# Patient Record
Sex: MALE | Race: WHITE | NOT HISPANIC OR LATINO | Employment: UNEMPLOYED | URBAN - METROPOLITAN AREA
[De-identification: names, ages, dates, MRNs, and addresses within clinical notes are randomized per-mention and may not be internally consistent; named-entity substitution may affect disease eponyms.]

---

## 2024-09-13 ENCOUNTER — DOCUMENTATION ONLY (OUTPATIENT)
Dept: ANTICOAGULATION | Facility: CLINIC | Age: 49
End: 2024-09-13

## 2024-09-13 ENCOUNTER — LAB REQUISITION (OUTPATIENT)
Dept: LAB | Facility: CLINIC | Age: 49
End: 2024-09-13

## 2024-09-13 ENCOUNTER — DOCUMENTATION ONLY (OUTPATIENT)
Dept: GERIATRICS | Facility: CLINIC | Age: 49
End: 2024-09-13

## 2024-09-13 ENCOUNTER — ANTICOAGULATION THERAPY VISIT (OUTPATIENT)
Dept: ANTICOAGULATION | Facility: CLINIC | Age: 49
End: 2024-09-13

## 2024-09-13 DIAGNOSIS — E11.40 TYPE 2 DIABETES MELLITUS WITH DIABETIC NEUROPATHY, UNSPECIFIED (H): ICD-10-CM

## 2024-09-13 DIAGNOSIS — I82.409 DVT (DEEP VENOUS THROMBOSIS) (H): Primary | ICD-10-CM

## 2024-09-13 DIAGNOSIS — I82.409 DEEP VEIN THROMBOSIS (DVT) (H): Primary | ICD-10-CM

## 2024-09-13 PROBLEM — M10.9 GOUT: Status: ACTIVE | Noted: 2024-09-08

## 2024-09-13 PROBLEM — M48.07 SPINAL STENOSIS OF LUMBOSACRAL REGION: Status: ACTIVE | Noted: 2024-09-06

## 2024-09-13 PROBLEM — E11.8 TYPE 2 DIABETES MELLITUS WITH COMPLICATION, WITHOUT LONG-TERM CURRENT USE OF INSULIN (H): Status: ACTIVE | Noted: 2018-11-17

## 2024-09-13 PROBLEM — E11.9 DM2 (DIABETES MELLITUS, TYPE 2) (H): Status: ACTIVE | Noted: 2024-09-08

## 2024-09-13 PROBLEM — E29.1 DEFICIENCY OF TESTOSTERONE BIOSYNTHESIS: Status: ACTIVE | Noted: 2024-09-13

## 2024-09-13 PROBLEM — F32.9 MAJOR DEPRESSION: Status: ACTIVE | Noted: 2024-09-08

## 2024-09-13 PROBLEM — F41.9 ANXIETY: Status: ACTIVE | Noted: 2019-03-14

## 2024-09-13 PROBLEM — D64.9 ANEMIA: Status: ACTIVE | Noted: 2024-09-08

## 2024-09-13 PROBLEM — G62.9 PERIPHERAL NEUROPATHY: Status: ACTIVE | Noted: 2019-03-14

## 2024-09-13 PROBLEM — R12 HEARTBURN: Status: ACTIVE | Noted: 2024-09-13

## 2024-09-13 PROBLEM — E87.1 HYPONATREMIA: Status: ACTIVE | Noted: 2024-09-08

## 2024-09-13 PROBLEM — A64 SEXUALLY TRANSMITTED INFECTION: Status: ACTIVE | Noted: 2024-09-13

## 2024-09-13 PROBLEM — G99.2 MYELOPATHY CONCURRENT WITH AND DUE TO STENOSIS OF LUMBAR SPINE (H): Status: ACTIVE | Noted: 2024-09-13

## 2024-09-13 PROBLEM — G43.909 MIGRAINES: Status: ACTIVE | Noted: 2019-03-14

## 2024-09-13 PROBLEM — G47.30 SLEEP APNEA: Status: ACTIVE | Noted: 2019-03-14

## 2024-09-13 PROBLEM — I10 HYPERTENSION: Status: ACTIVE | Noted: 2024-09-13

## 2024-09-13 PROBLEM — E78.5 DYSLIPIDEMIA: Status: ACTIVE | Noted: 2018-11-17

## 2024-09-13 PROBLEM — E78.00 HYPERCHOLESTEROLEMIA: Status: ACTIVE | Noted: 2019-03-14

## 2024-09-13 PROBLEM — K25.9 GASTRIC ULCER: Status: ACTIVE | Noted: 2019-03-14

## 2024-09-13 PROBLEM — N52.9 ERECTILE DYSFUNCTION: Status: ACTIVE | Noted: 2024-09-13

## 2024-09-13 PROBLEM — G89.18 POST-OPERATIVE PAIN: Status: ACTIVE | Noted: 2024-09-08

## 2024-09-13 PROBLEM — F41.1 GAD (GENERALIZED ANXIETY DISORDER): Status: ACTIVE | Noted: 2024-09-08

## 2024-09-13 PROBLEM — I10 ESSENTIAL HYPERTENSION: Status: ACTIVE | Noted: 2018-11-17

## 2024-09-13 PROBLEM — M48.061 MYELOPATHY CONCURRENT WITH AND DUE TO STENOSIS OF LUMBAR SPINE (H): Status: ACTIVE | Noted: 2024-09-13

## 2024-09-13 LAB
ANION GAP SERPL CALCULATED.3IONS-SCNC: 9 MMOL/L (ref 7–15)
BUN SERPL-MCNC: 11.8 MG/DL (ref 6–20)
CALCIUM SERPL-MCNC: 8.7 MG/DL (ref 8.8–10.4)
CHLORIDE SERPL-SCNC: 101 MMOL/L (ref 98–107)
CREAT SERPL-MCNC: 0.62 MG/DL (ref 0.67–1.17)
EGFRCR SERPLBLD CKD-EPI 2021: >90 ML/MIN/1.73M2
ERYTHROCYTE [DISTWIDTH] IN BLOOD BY AUTOMATED COUNT: 16.7 % (ref 10–15)
GLUCOSE SERPL-MCNC: 184 MG/DL (ref 70–99)
HCO3 SERPL-SCNC: 26 MMOL/L (ref 22–29)
HCT VFR BLD AUTO: 30.6 % (ref 40–53)
HGB BLD-MCNC: 10.1 G/DL (ref 13.3–17.7)
INR (EXTERNAL): 2
MCH RBC QN AUTO: 29 PG (ref 26.5–33)
MCHC RBC AUTO-ENTMCNC: 33 G/DL (ref 31.5–36.5)
MCV RBC AUTO: 88 FL (ref 78–100)
PLATELET # BLD AUTO: 260 10E3/UL (ref 150–450)
POTASSIUM SERPL-SCNC: 4 MMOL/L (ref 3.4–5.3)
RBC # BLD AUTO: 3.48 10E6/UL (ref 4.4–5.9)
SODIUM SERPL-SCNC: 136 MMOL/L (ref 135–145)
WBC # BLD AUTO: 7.9 10E3/UL (ref 4–11)

## 2024-09-13 PROCEDURE — 85027 COMPLETE CBC AUTOMATED: CPT | Performed by: NURSE PRACTITIONER

## 2024-09-13 PROCEDURE — 80048 BASIC METABOLIC PNL TOTAL CA: CPT | Performed by: NURSE PRACTITIONER

## 2024-09-13 NOTE — PROGRESS NOTES
ANTICOAGULATION CLINIC REFERRAL RENEWAL REQUEST       An annual renewal order is required for all patients referred to [facility].?  Please review and sign the pended referral order for Luiz Matthews.       ANTICOAGULATION SUMMARY      Warfarin indication(s)   DVT    Mechanical heart valve present?  NO       Current goal range   INR: 2.0-3.0     Goal appropriate for indication? Goal INR 2-3, standard for indication(s) above     Time in Therapeutic Range (TTR)  (Goal > 60%) NA%       Office visit with referring provider's group within last year no new admit on 9/12/24       Tracee Lucas, RN  [facility]

## 2024-09-13 NOTE — PROGRESS NOTES
ANTICOAGULATION MANAGEMENT     Luiz Matthews 49 year old male is on warfarin with therapeutic INR result. (Goal INR 2.0-3.0)    Recent labs: (last 7 days)     09/13/24  1526   INR 2.0     Recent Labs     09/12/24  0601 09/11/24  1053 09/10/24  1054 09/06/24  1500 09/05/24  1123   INR 1.7 H 1.3 H 1.2 1.1 1.1     Dosing and Monitoring History   Date INR/CFX Warfarin Dose   9/10 1.2 6 mg   9/11 1.3 8mg   9/12 1.7 6mg     Per RHP note from IP: Home warfarin dose: alternates 10 mg and 6 mg every other day     ASSESSMENT     Source(s): Chart Review     Warfarin doses taken: While hospitalized on 9/6: anticoagulation calendar updated with inpatient dosing.  Discharged on: patient discharge on 6 mg and to recheck today 9/13/24  Diet:  KALEY-new admit  Medication/supplement changes: None noted  New illness, injury, or hospitalization: Yes: hospitalized for: Procedure: Procedure(s):  Anterior Spine interbody Fusion L1 to: S1  Posterior Spine Fusion with Instrumentation T4 to: S1, [place], Redo Decompression - Foraminotomy L2-3, L3-4 and L5-S1 Bilateral  Signs or symptoms of bleeding or clotting: No  Previous result: Subtherapeutic  Additional findings: None       PLAN     Recommended plan for no diet, medication or health factor changes affecting INR     Dosing Instructions:  change dose to 3 mg daily  with next INR in 3 days       Summary  As of 9/13/2024      Full warfarin instructions:  3 mg every day   Next INR check:  9/16/2024               Telephone call with [facility] nurse (medical care for seniors) who verbalizes understanding and agrees to plan and who agrees to plan and repeated back plan correctly    Orders given to  Homecare nurse/facility to recheck    Education provided: Please call back if any changes to your diet, medications or how you've been taking warfarin    Plan made with [facility] Pharmacist Yuliana Lucas, OPAL  9/13/2024  Anticoagulation Clinic  [facility]  routing messages: p [facility] (TCU/LTC/nursing home)  ACC patient phone line: [phone]        _______________________________________________________________________     Anticoagulation Episode Summary       Current INR goal:  2.0-3.0   TTR:  --   Target end date:  Indefinite   Send INR reminders to:  [facility] (TCU/LTC/RICK)    Indications    Deep vein thrombosis (DVT) (H) [I82.409]  DVT (deep venous thrombosis) (H) [I82.409]             Comments:  [facility]   [phone]                 Anticoagulation Care Providers       Provider Role Specialty Phone number    Liliane Pratt NP Referring Nurse Practitioner [phone]

## 2024-09-16 ENCOUNTER — ANTICOAGULATION THERAPY VISIT (OUTPATIENT)
Dept: ANTICOAGULATION | Facility: CLINIC | Age: 49
End: 2024-09-16

## 2024-09-16 ENCOUNTER — TRANSITIONAL CARE UNIT VISIT (OUTPATIENT)
Dept: GERIATRICS | Facility: CLINIC | Age: 49
End: 2024-09-16

## 2024-09-16 VITALS
TEMPERATURE: 95.6 F | BODY MASS INDEX: 36.45 KG/M2 | OXYGEN SATURATION: 99 % | HEART RATE: 65 BPM | HEIGHT: 78 IN | RESPIRATION RATE: 20 BRPM | WEIGHT: 315 LBS | DIASTOLIC BLOOD PRESSURE: 75 MMHG | SYSTOLIC BLOOD PRESSURE: 132 MMHG

## 2024-09-16 DIAGNOSIS — I10 BENIGN ESSENTIAL HYPERTENSION: ICD-10-CM

## 2024-09-16 DIAGNOSIS — Z79.4 TYPE 2 DIABETES MELLITUS WITH HYPEROSMOLARITY WITHOUT COMA, WITH LONG-TERM CURRENT USE OF INSULIN (H): ICD-10-CM

## 2024-09-16 DIAGNOSIS — N40.0 BENIGN PROSTATIC HYPERPLASIA WITHOUT LOWER URINARY TRACT SYMPTOMS: ICD-10-CM

## 2024-09-16 DIAGNOSIS — F41.8 DEPRESSION WITH ANXIETY: ICD-10-CM

## 2024-09-16 DIAGNOSIS — Z98.1 S/P LUMBAR SPINAL FUSION: ICD-10-CM

## 2024-09-16 DIAGNOSIS — K59.01 SLOW TRANSIT CONSTIPATION: ICD-10-CM

## 2024-09-16 DIAGNOSIS — E02 SUBCLINICAL IODINE-DEFICIENCY HYPOTHYROIDISM: ICD-10-CM

## 2024-09-16 DIAGNOSIS — M54.40 CHRONIC LOW BACK PAIN WITH SCIATICA, SCIATICA LATERALITY UNSPECIFIED, UNSPECIFIED BACK PAIN LATERALITY: Primary | ICD-10-CM

## 2024-09-16 DIAGNOSIS — E11.00 TYPE 2 DIABETES MELLITUS WITH HYPEROSMOLARITY WITHOUT COMA, WITH LONG-TERM CURRENT USE OF INSULIN (H): ICD-10-CM

## 2024-09-16 DIAGNOSIS — G60.3 IDIOPATHIC PROGRESSIVE NEUROPATHY: ICD-10-CM

## 2024-09-16 DIAGNOSIS — R91.8 PULMONARY NODULES: ICD-10-CM

## 2024-09-16 DIAGNOSIS — Z86.718 PERSONAL HISTORY OF DVT (DEEP VEIN THROMBOSIS): ICD-10-CM

## 2024-09-16 DIAGNOSIS — R94.31 QT PROLONGATION: ICD-10-CM

## 2024-09-16 DIAGNOSIS — G47.30 SLEEP APNEA, UNSPECIFIED TYPE: ICD-10-CM

## 2024-09-16 DIAGNOSIS — D64.9 ACUTE ON CHRONIC ANEMIA: ICD-10-CM

## 2024-09-16 DIAGNOSIS — G89.29 CHRONIC LOW BACK PAIN WITH SCIATICA, SCIATICA LATERALITY UNSPECIFIED, UNSPECIFIED BACK PAIN LATERALITY: Primary | ICD-10-CM

## 2024-09-16 DIAGNOSIS — G89.4 CHRONIC PAIN SYNDROME: ICD-10-CM

## 2024-09-16 LAB — INR (EXTERNAL): 2.4

## 2024-09-16 PROCEDURE — 99418 PROLNG IP/OBS E/M EA 15 MIN: CPT | Performed by: NURSE PRACTITIONER

## 2024-09-16 PROCEDURE — 99310 SBSQ NF CARE HIGH MDM 45: CPT | Performed by: NURSE PRACTITIONER

## 2024-09-16 RX ORDER — LEVOTHYROXINE SODIUM 75 UG/1
75 TABLET ORAL EVERY MORNING
COMMUNITY
Start: 2024-09-16

## 2024-09-16 RX ORDER — TAMSULOSIN HYDROCHLORIDE 0.4 MG/1
0.4 CAPSULE ORAL DAILY
COMMUNITY
Start: 2024-09-16

## 2024-09-16 RX ORDER — METHOCARBAMOL 750 MG/1
750 TABLET, FILM COATED ORAL 4 TIMES DAILY
COMMUNITY
Start: 2024-09-16

## 2024-09-16 RX ORDER — DIAZEPAM 5 MG
5 TABLET ORAL 2 TIMES DAILY PRN
COMMUNITY
Start: 2024-09-16 | End: 2024-09-24

## 2024-09-16 RX ORDER — BUPRENORPHINE 2 MG/1
2 TABLET SUBLINGUAL DAILY
COMMUNITY
Start: 2024-09-16 | End: 2024-09-17

## 2024-09-16 RX ORDER — ALLOPURINOL 300 MG/1
300 TABLET ORAL DAILY
COMMUNITY
Start: 2024-09-16

## 2024-09-16 RX ORDER — DULOXETIN HYDROCHLORIDE 60 MG/1
60 CAPSULE, DELAYED RELEASE ORAL AT BEDTIME
COMMUNITY
Start: 2024-09-16

## 2024-09-16 RX ORDER — BENAZEPRIL HYDROCHLORIDE 40 MG/1
40 TABLET ORAL EVERY MORNING
COMMUNITY
Start: 2024-09-16

## 2024-09-16 RX ORDER — MORPHINE SULFATE 15 MG/1
15 TABLET ORAL 4 TIMES DAILY PRN
COMMUNITY
Start: 2024-09-16 | End: 2024-09-17

## 2024-09-16 RX ORDER — ACETAMINOPHEN 500 MG
1000 TABLET ORAL 4 TIMES DAILY
COMMUNITY
Start: 2024-09-16

## 2024-09-16 RX ORDER — BUSPIRONE HYDROCHLORIDE 15 MG/1
15 TABLET ORAL 2 TIMES DAILY
COMMUNITY
Start: 2024-09-16

## 2024-09-16 RX ORDER — GABAPENTIN 300 MG/1
600 CAPSULE ORAL 3 TIMES DAILY
COMMUNITY
Start: 2024-09-16 | End: 2024-09-26

## 2024-09-16 RX ORDER — NICOTINE 21 MG/24HR
1 PATCH, TRANSDERMAL 24 HOURS TRANSDERMAL EVERY 24 HOURS
COMMUNITY
Start: 2024-09-16

## 2024-09-16 RX ORDER — ATORVASTATIN CALCIUM 80 MG/1
80 TABLET, FILM COATED ORAL AT BEDTIME
COMMUNITY
Start: 2024-09-16

## 2024-09-16 RX ORDER — AMOXICILLIN 250 MG
2 CAPSULE ORAL 2 TIMES DAILY
COMMUNITY
Start: 2024-09-16

## 2024-09-16 RX ORDER — CELECOXIB 200 MG/1
200 CAPSULE ORAL 2 TIMES DAILY
COMMUNITY
Start: 2024-09-16

## 2024-09-16 RX ORDER — SENNOSIDES A AND B 8.6 MG/1
1 TABLET, FILM COATED ORAL DAILY PRN
COMMUNITY
Start: 2024-09-16

## 2024-09-16 RX ORDER — FENOFIBRATE 145 MG/1
145 TABLET, COATED ORAL DAILY
COMMUNITY
Start: 2024-09-16

## 2024-09-16 RX ORDER — METOPROLOL TARTRATE 100 MG
100 TABLET ORAL 2 TIMES DAILY
COMMUNITY
Start: 2024-09-16

## 2024-09-16 RX ORDER — MORPHINE SULFATE 15 MG/1
30 TABLET, FILM COATED, EXTENDED RELEASE ORAL EVERY 12 HOURS
COMMUNITY
Start: 2024-09-16 | End: 2024-09-20

## 2024-09-16 RX ORDER — POLYETHYLENE GLYCOL 3350 17 G/17G
17 POWDER, FOR SOLUTION ORAL DAILY PRN
COMMUNITY
Start: 2024-09-16

## 2024-09-16 RX ORDER — ALBUTEROL SULFATE 90 UG/1
2 AEROSOL, METERED RESPIRATORY (INHALATION) 4 TIMES DAILY PRN
COMMUNITY
Start: 2024-09-16

## 2024-09-16 RX ORDER — GUAIFENESIN 600 MG/1
1200 TABLET, EXTENDED RELEASE ORAL 2 TIMES DAILY
COMMUNITY
Start: 2024-09-16

## 2024-09-16 RX ORDER — ASPIRIN 325 MG
325 TABLET, DELAYED RELEASE (ENTERIC COATED) ORAL DAILY
COMMUNITY
Start: 2024-09-16

## 2024-09-16 NOTE — LETTER
9/16/2024      Luiz Matthews  [address] [address]        [facility]    Code Status:  FULL CODE   Visit Type:   Chief Complaint   Patient presents with     TCU Admission     [facility] 9/6/2024 - 9/12/2024     Facility:  [facility] [[id number]]         HPI: Luiz Matthews is a 49 year old male who I am seeing today for admit to the TCU. Past medical history includes chronic back pain with radiculopathy, chronic opioid use, history of DVT on warfarin, hypertension, dyslipidemia, GERD with history of gastric ulcer, obesity, obstructive sleep apnea, depression with anxiety, BPH, hypothyroidism, gout, tobacco abuse, EtOH history in remission, thrombocytopenia and type 2 diabetes on insulin.  Patient with a history of spinal stenosis of the lumbar sacral region.  He is underwent surgery with a failed decompression in the past.  Patient underwent anterior spinal fusion of L1-S1 and posterior spinal fusion with instrumentation of T4-S1.  He had a large blood loss volume of 4050 cc requiring 1 unit of fresh frozen plasma.  History of chronic pain syndrome.  He was on oxycodone, methocarbamol, diazepam, amitriptyline and hydroxyzine at home.  He was seen by the pain team several times during his hospitalization and ultimately was placed on long-acting and short acting morphine.  Also on buprenorphine. He continues on methocarbamol and diazepam.  He was followed outpatient by pain clinic.  TLSO splinting out of bed.  History of major depression with anxiety.  Continues on buspirone, amitriptyline and duloxetine.  He is not on his hydroxyzine due to prolonged QTc during hospitalization.  Obstructive sleep apnea on CPAP.  BPH on Flomax.  Hypertension.  Metoprolol increased during hospitalization.  His Lasix and chlorthalidone continue to be on hold due to poor oral intake.  He is also on benazepril.  History of DVT back in his 20s while being a .  He had an  IVC filter placed on 9//24 preventatively before surgery.  Continues on warfarin.    Transitional Care Course: Today patient lying in bed.  He continues to report pain in his back radiating down into his left buttocks, hip and groin.  He is asking for an increase in his pain medication.  With him and his wife via phone I did report review his pain management thoroughly.  He continues on methocarbamol 750 mg 4 times daily.  He is receiving MS Contin 30 mg twice daily.  He has short acting morphine 15 mg every 4 hours as needed.  He is having increased spasms however with look back of his medications he is not utilizing all of his as needed.  He also has as needed Valium which she is only used once.  Patient is able to get out of bed and ambulate with a rolling walker.  He went out for a visit over the weekend with his wife.  He reports some shortness of breath with exertion.  No cough.  Afebrile.  He is emptying his bladder.  He reports he had a very very large bowel movement last evening and a smaller one this a.m.  He continues on senna.  He had a lung nodule that was found incidentally during hospitalization which was thought to be possible infectious process and recommended repeat CT in 3 months.  He continues on warfarin.  INR 2.4.  Blood pressure appears satisfactory.  Diabetes mellitus type 2.  He is on Lantus and Ozempic.  Currently metformin and glimepiride on hold.  Appetite is beginning to improve.  Today he is eating multiple sweets including Ermias cups.  Patient educated on diabetic diet.  Patient with diabetic foot ulcer to the right great toe.  He was being followed by the wound center per his wife outpatient.  Patient is from [place].  He has a small 0.3 x 0.3 ulcer to the right great toe.  No drainage on dressing.  No redness or warmth.      Assessment/Plan:     Chronic low back pain with sciatica, sciatica laterality unspecified, unspecified back pain laterality  S/P lumbar spinal fusion  Chronic pain  syndrome  -Status post anterior spinal interbody fusion L1-S1; status post posterior spine fusion with instrumentation T4-S1.  -Encourage out of bed activity.  -TLSO OOB.   -Continue methocarbamol 750 mg every 6 hours  -Continue MS Contin 30 mg every 12 hours  -Continue morphine immediate release 15 mg every 4 hours as needed.  Every 4 hours pain assessment.  -Diazepam 5 mg twice daily as needed.  -Celebrex 200 mg 3 times daily ending on 9/19/2024.  -Continue buprenorphine 2 mg daily.    Idiopathic progressive neuropathy  -Continue gabapentin 600 mg 3 times daily.    Type 2 diabetes mellitus with hyperosmolarity without coma, with long-term current use of insulin (H)  -Consistent carb diet.  -Accu-Cheks 4 times daily.  -Increase Lantus to 44 units nightly.  -Continue sliding scale with meals 3 times daily.  -Currently off Ozempic, hold the provide and metformin.    Sleep apnea, unspecified type  -Continue CPAP.    Benign essential hypertension  -Currently his home chlorthalidone and Lasix are being held. (Home dose of Lasix was 40 mg in the a.m.).   -Metoprolol 100 mg twice daily.  -Benazepril 40 mg daily  -Monitor BP    Personal history of DVT (deep vein thrombosis)  -IVC filter placed on 9//24.  -Continue warfarin.  -INR today 2.4.  INR clinic managing Coumadin.  -Her IR filter can be retrievable or permanent.  Set up filter removal 1 is no longer needed through IR.    Subclinical iodine-deficiency hypothyroidism  TSH 2.91 on 9/8.  -Levothyroxine 75 mcg daily.    Benign prostatic hyperplasia without lower urinary tract symptoms  -Flomax 0.4 mg nightly.    Depression with anxiety  -Continue prior to admit buspirone 15 mg twice daily.  -Continue prior to admit amitriptyline 25 mg nightly.  -Continue prior to admit duloxetine 60 mg nightly.  -Patient currently not on hydroxyzine but was receiving hydroxyzine 25 mg every 8 hours as needed at home.    Pulmonary nodule  -Found incidentally on chest CTA.  -Recommend  repeat CT scan in 3 months.    Acute on chronic anemia  Thrombocytopenia  -Hemoglobin 10.1.  -Platelets slowly trending down from baseline of 219.  Last platelets 148.  -Mild AST elevation but improving most likely to poor perfusion.    QT prolongation  -On multiple sedating and QT prolonging meds at baseline including amitriptyline, buprenorphine as well as pantoprazole and furosemide.  -Lasix and chlorthalidone continue to be on hold.  -EKG showed possible new right bundle branch block with ST depression and some chest leads on 9/6.  No past TTE in system.  Patient underwent TTE, CT CA and echo which were reassuring.  -Last QTc 510.     Slow transit constipation  -Continue senna twice daily.    History of tobacco abuse  -Smoked 2 packs/day.  -Continue nicotine patch.    -Okay for PT OT eval and treat.    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     DVT (deep venous thrombosis) (H) 09/08/2024     Dyslipidemia 11/17/2018     Essential hypertension 11/17/2018     Hypertension 09/13/2024     CORIE (generalized anxiety disorder) 09/08/2024     Gastric ulcer 03/14/2019     Gout 09/08/2024     Heartburn 09/13/2024     Sexually transmitted infection 09/13/2024     Post-operative pain 09/08/2024     Peripheral neuropathy 03/14/2019     Migraines 03/14/2019     Major depression 09/08/2024     Hyponatremia 09/08/2024     Hypercholesterolemia 03/14/2019     Sleep apnea 03/14/2019     Myelopathy concurrent with and due to stenosis of lumbar spine (H) 09/13/2024     Spinal stenosis of lumbosacral region 09/06/2024     DM2 (diabetes mellitus, type 2) (H) 09/08/2024     Type 2 diabetes mellitus with complication, without long-term current use of insulin (H) 11/17/2018     Erectile dysfunction 09/13/2024     Deep vein thrombosis (DVT) (H) 09/13/2024     Resolved Ambulatory Problems     Diagnosis Date Noted     No Resolved  Ambulatory Problems     No Additional Past Medical History     Allergies   Allergen Reactions     Penicillins Hives and Swelling     Empagliflozin Other (See Comments)     Hypotension       All Meds and Allergies reviewed in the record at the facility and is the most up-to-date.    Post Discharge Medication Reconciliation Status: discharge medications reconciled, continue medications without change  Current Outpatient Medications   Medication Sig Dispense Refill     acetaminophen (TYLENOL) 500 MG tablet Take 1,000 mg by mouth 4 times daily.       albuterol (PROAIR HFA/PROVENTIL HFA/VENTOLIN HFA) 108 (90 Base) MCG/ACT inhaler Inhale 2 puffs into the lungs 4 times daily as needed for shortness of breath, wheezing or cough.       allopurinol (ZYLOPRIM) 300 MG tablet Take 300 mg by mouth daily.       amitriptyline (ELAVIL) 25 MG tablet Take 25 mg by mouth at bedtime.       aspirin (ASA) 325 MG EC tablet Take 325 mg by mouth daily.       atorvastatin (LIPITOR) 80 MG tablet Take 80 mg by mouth at bedtime.       benazepril (LOTENSIN) 40 MG tablet Take 40 mg by mouth every morning.       buprenorphine (SUBUTEX) 2 MG SUBL sublingual tablet Place 2 mg under the tongue daily.       busPIRone (BUSPAR) 15 MG tablet Take 15 mg by mouth 2 times daily.       celecoxib (CELEBREX) 200 MG capsule Take 200 mg by mouth 2 times daily.       diazepam (VALIUM) 5 MG tablet Take 5 mg by mouth 2 times daily as needed for anxiety.       DULoxetine (CYMBALTA) 60 MG capsule Take 60 mg by mouth at bedtime.       fenofibrate (TRICOR) 145 MG tablet Take 145 mg by mouth daily.       gabapentin (NEURONTIN) 300 MG capsule Take 600 mg by mouth 3 times daily.       guaiFENesin (MUCINEX) 600 MG 12 hr tablet Take 1,200 mg by mouth 2 times daily.       insulin aspart (NOVOLOG PEN) 100 UNIT/ML pen Inject subcutaneously 3 times daily (with meals). Per Sliding Scale;  If Blood Sugar is less than 70, call MD.  If Blood Sugar is 150 to 199, give 2 Units.  If  "Blood Sugar is 200 to 249, give 4 Units.  If Blood Sugar is 250 to 299, give 6 Units.  If Blood Sugar is 300 to 349, give 8 Units.  If Blood Sugar is 350 to 399, give 10 Units.  If Blood Sugar is 400 to 499, give 12 Units.       insulin glargine (LANTUS PEN) 100 UNIT/ML pen Inject 40 Units subcutaneously at bedtime.       levothyroxine (SYNTHROID/LEVOTHROID) 75 MCG tablet Take 75 mcg by mouth every morning.       methocarbamol (ROBAXIN) 750 MG tablet Take 750 mg by mouth 4 times daily.       metoprolol tartrate (LOPRESSOR) 100 MG tablet Take 100 mg by mouth 2 times daily.       morphine (MS CONTIN) 15 MG CR tablet Take 30 mg by mouth every 12 hours.       morphine (MSIR) 15 MG IR tablet Take 15 mg by mouth 4 times daily as needed for severe pain.       naloxone (NARCAN) 4 MG/0.1ML nasal spray Spray 4 mg into one nostril alternating nostrils as needed for opioid reversal. every 2-3 minutes until assistance arrives       nicotine (NICODERM CQ) 21 MG/24HR 24 hr patch Place 1 patch onto the skin every 24 hours.       omeprazole (PRILOSEC) 20 MG DR capsule Take 20 mg by mouth daily.       polyethylene glycol (MIRALAX) 17 g packet Take 17 g by mouth daily as needed for constipation.       senna (SENOKOT) 8.6 MG tablet Take 1 tablet by mouth daily as needed for constipation.       senna-docusate (SENOKOT-S/PERICOLACE) 8.6-50 MG tablet Take 2 tablets by mouth 2 times daily.       tamsulosin (FLOMAX) 0.4 MG capsule Take 0.4 mg by mouth daily.       No current facility-administered medications for this visit.       REVIEW OF SYSTEMS:   10 point review of systems reviewed and pertinent positives in the HPI.     PHYSICAL EXAMINATION:  Physical Exam     Vital signs: /75   Pulse 65   Temp (!) 95.6  F (35.3  C)   Resp 20   Ht 1.994 m (6' 6.5\")   Wt 147.4 kg (325 lb)   SpO2 99%   BMI 37.08 kg/m    General: Awake, Alert, oriented x3, lying in bed, appropriately, follows simple commands, conversant  HEENT: Pink " conjunctiva, moist oral mucosa  NECK: Supple  CVS:  S1  S2, without murmur or gallop.   LUNG: Clear to auscultation, No wheezes, rales or rhonci.  BACK: Dressing intact to incision from the lumbar spine to top of thoracic spine.   ABDOMEN: Soft, obese, with positive bowel sounds.  Abdominal incision intact with Steri-Strips.  No drainage.  EXTREMITIES: Good range of motion on both upper and lower extremities, no pedal edema, no calf tenderness  SKIN: Right great toe with small ulcer.  No drainage.  No redness or warmth.  NEUROLOGIC: Intact, pulses palpable  PSYCHIATRIC: Cognition intact      Labs:  All labs reviewed in the nursing home record and Epic   @  Lab Results   Component Value Date    WBC 7.9 09/13/2024     Lab Results   Component Value Date    RBC 3.48 09/13/2024     Lab Results   Component Value Date    HGB 10.1 09/13/2024     Lab Results   Component Value Date    HCT 30.6 09/13/2024     Lab Results   Component Value Date    MCV 88 09/13/2024     Lab Results   Component Value Date    MCH 29.0 09/13/2024     Lab Results   Component Value Date    MCHC 33.0 09/13/2024     Lab Results   Component Value Date    RDW 16.7 09/13/2024     Lab Results   Component Value Date     09/13/2024        @Last Comprehensive Metabolic Panel:  Sodium   Date Value Ref Range Status   09/13/2024 136 135 - 145 mmol/L Final     Potassium   Date Value Ref Range Status   09/13/2024 4.0 3.4 - 5.3 mmol/L Final     Chloride   Date Value Ref Range Status   09/13/2024 101 98 - 107 mmol/L Final     Carbon Dioxide (CO2)   Date Value Ref Range Status   09/13/2024 26 22 - 29 mmol/L Final     Anion Gap   Date Value Ref Range Status   09/13/2024 9 7 - 15 mmol/L Final     Glucose   Date Value Ref Range Status   09/13/2024 184 (H) 70 - 99 mg/dL Final     Urea Nitrogen   Date Value Ref Range Status   09/13/2024 11.8 6.0 - 20.0 mg/dL Final     Creatinine   Date Value Ref Range Status   09/13/2024 0.62 (L) 0.67 - 1.17 mg/dL Final     GFR  Estimate   Date Value Ref Range Status   09/13/2024 >90 >60 mL/min/1.73m2 Final     Comment:     eGFR calculated using 2021 CKD-EPI equation.     Calcium   Date Value Ref Range Status   09/13/2024 8.7 (L) 8.8 - 10.4 mg/dL Final     Comment:     Reference intervals for this test were updated on 7/16/2024 to reflect our healthy population more accurately. There may be differences in the flagging of prior results with similar values performed with this method. Those prior results can be interpreted in the context of the updated reference intervals.     60 minutes spent preparing for this visit as well as face-to-face time spent with patient reviewing medications, pain management, imaging, labs as well as speaking with patient and his wife and collaborating with nursing staff.      This note has been dictated using voice recognition software. Any grammatical or context distortions are unintentional and inherent to the software    Electronically signed by: Liliane Pratt CNP       Sincerely,        Liliane Pratt NP

## 2024-09-16 NOTE — PROGRESS NOTES
[facility] GERIATRIC SERVICES    Code Status:  FULL CODE   Visit Type:   Chief Complaint   Patient presents with    TCU Admission     Abbott Northwester 9/6/2024 - 9/12/2024     Facility:  [facility] ([facility]) [[id number]]         HPI: Luiz Matthews is a 49 year old male who I am seeing today for admit to the TCU. Past medical history includes chronic back pain with radiculopathy, chronic opioid use, history of DVT on warfarin, hypertension, dyslipidemia, GERD with history of gastric ulcer, obesity, obstructive sleep apnea, depression with anxiety, BPH, hypothyroidism, gout, tobacco abuse, EtOH history in remission, thrombocytopenia and type 2 diabetes on insulin.  Patient with a history of spinal stenosis of the lumbar sacral region.  He is underwent surgery with a failed decompression in the past.  Patient underwent anterior spinal fusion of L1-S1 and posterior spinal fusion with instrumentation of T4-S1.  He had a large blood loss volume of 4050 cc requiring 1 unit of fresh frozen plasma.  History of chronic pain syndrome.  He was on oxycodone, methocarbamol, diazepam, amitriptyline and hydroxyzine at home.  He was seen by the pain team several times during his hospitalization and ultimately was placed on long-acting and short acting morphine.  Also on buprenorphine. He continues on methocarbamol and diazepam.  He was followed outpatient by pain clinic.  TLSO splinting out of bed.  History of major depression with anxiety.  Continues on buspirone, amitriptyline and duloxetine.  He is not on his hydroxyzine due to prolonged QTc during hospitalization.  Obstructive sleep apnea on CPAP.  BPH on Flomax.  Hypertension.  Metoprolol increased during hospitalization.  His Lasix and chlorthalidone continue to be on hold due to poor oral intake.  He is also on benazepril.  History of DVT back in his 20s while being a .  He had an IVC filter placed on 9//24 preventatively before surgery.  Continues on  warfarin.    Transitional Care Course: Today patient lying in bed.  He continues to report pain in his back radiating down into his left buttocks, hip and groin.  He is asking for an increase in his pain medication.  With him and his wife via phone I did report review his pain management thoroughly.  He continues on methocarbamol 750 mg 4 times daily.  He is receiving MS Contin 30 mg twice daily.  He has short acting morphine 15 mg every 4 hours as needed.  He is having increased spasms however with look back of his medications he is not utilizing all of his as needed.  He also has as needed Valium which she is only used once.  Patient is able to get out of bed and ambulate with a rolling walker.  He went out for a visit over the weekend with his wife.  He reports some shortness of breath with exertion.  No cough.  Afebrile.  He is emptying his bladder.  He reports he had a very very large bowel movement last evening and a smaller one this a.m.  He continues on senna.  He had a lung nodule that was found incidentally during hospitalization which was thought to be possible infectious process and recommended repeat CT in 3 months.  He continues on warfarin.  INR 2.4.  Blood pressure appears satisfactory.  Diabetes mellitus type 2.  He is on Lantus and Ozempic.  Currently metformin and glimepiride on hold.  Appetite is beginning to improve.  Today he is eating multiple sweets including Ermias cups.  Patient educated on diabetic diet.  Patient with diabetic foot ulcer to the right great toe.  He was being followed by the wound center per his wife outpatient.  Patient is from [place].  He has a small 0.3 x 0.3 ulcer to the right great toe.  No drainage on dressing.  No redness or warmth.      Assessment/Plan:     Chronic low back pain with sciatica, sciatica laterality unspecified, unspecified back pain laterality  S/P lumbar spinal fusion  Chronic pain syndrome  -Status post anterior spinal interbody fusion L1-S1; status  post posterior spine fusion with instrumentation T4-S1.  -Encourage out of bed activity.  -TLSO OOB.   -Continue methocarbamol 750 mg every 6 hours  -Continue MS Contin 30 mg every 12 hours  -Continue morphine immediate release 15 mg every 4 hours as needed.  Every 4 hours pain assessment.  -Diazepam 5 mg twice daily as needed.  -Celebrex 200 mg 3 times daily ending on 9/19/2024.  -Continue buprenorphine 2 mg daily.    Idiopathic progressive neuropathy  -Continue gabapentin 600 mg 3 times daily.    Type 2 diabetes mellitus with hyperosmolarity without coma, with long-term current use of insulin (H)  -Consistent carb diet.  -Accu-Cheks 4 times daily.  -Increase Lantus to 44 units nightly.  -Continue sliding scale with meals 3 times daily.  -Currently off Ozempic, hold the provide and metformin.    Sleep apnea, unspecified type  -Continue CPAP.    Benign essential hypertension  -Currently his home chlorthalidone and Lasix are being held. (Home dose of Lasix was 40 mg in the a.m.).   -Metoprolol 100 mg twice daily.  -Benazepril 40 mg daily  -Monitor BP    Personal history of DVT (deep vein thrombosis)  -IVC filter placed on 9//24.  -Continue warfarin.  -INR today 2.4.  INR clinic managing Coumadin.  -Her IR filter can be retrievable or permanent.  Set up filter removal 1 is no longer needed through IR.    Subclinical iodine-deficiency hypothyroidism  TSH 2.91 on 9/8.  -Levothyroxine 75 mcg daily.    Benign prostatic hyperplasia without lower urinary tract symptoms  -Flomax 0.4 mg nightly.    Depression with anxiety  -Continue prior to admit buspirone 15 mg twice daily.  -Continue prior to admit amitriptyline 25 mg nightly.  -Continue prior to admit duloxetine 60 mg nightly.  -Patient currently not on hydroxyzine but was receiving hydroxyzine 25 mg every 8 hours as needed at home.    Pulmonary nodule  -Found incidentally on chest CTA.  -Recommend repeat CT scan in 3 months.    Acute on chronic  anemia  Thrombocytopenia  -Hemoglobin 10.1.  -Platelets slowly trending down from baseline of 219.  Last platelets 148.  -Mild AST elevation but improving most likely to poor perfusion.    QT prolongation  -On multiple sedating and QT prolonging meds at baseline including amitriptyline, buprenorphine as well as pantoprazole and furosemide.  -Lasix and chlorthalidone continue to be on hold.  -EKG showed possible new right bundle branch block with ST depression and some chest leads on 9/6.  No past TTE in system.  Patient underwent TTE, CT CA and echo which were reassuring.  -Last QTc 510.     Slow transit constipation  -Continue senna twice daily.    History of tobacco abuse  -Smoked 2 packs/day.  -Continue nicotine patch.    -Okay for PT OT eval and treat.    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    DVT (deep venous thrombosis) (H) 09/08/2024    Dyslipidemia 11/17/2018    Essential hypertension 11/17/2018    Hypertension 09/13/2024    CORIE (generalized anxiety disorder) 09/08/2024    Gastric ulcer 03/14/2019    Gout 09/08/2024    Heartburn 09/13/2024    Sexually transmitted infection 09/13/2024    Post-operative pain 09/08/2024    Peripheral neuropathy 03/14/2019    Migraines 03/14/2019    Major depression 09/08/2024    Hyponatremia 09/08/2024    Hypercholesterolemia 03/14/2019    Sleep apnea 03/14/2019    Myelopathy concurrent with and due to stenosis of lumbar spine (H) 09/13/2024    Spinal stenosis of lumbosacral region 09/06/2024    DM2 (diabetes mellitus, type 2) (H) 09/08/2024    Type 2 diabetes mellitus with complication, without long-term current use of insulin (H) 11/17/2018    Erectile dysfunction 09/13/2024    Deep vein thrombosis (DVT) (H) 09/13/2024     Resolved Ambulatory Problems     Diagnosis Date Noted    No Resolved Ambulatory Problems     No Additional Past Medical History     Allergies    Allergen Reactions    Penicillins Hives and Swelling    Empagliflozin Other (See Comments)     Hypotension       All Meds and Allergies reviewed in the record at the facility and is the most up-to-date.    Post Discharge Medication Reconciliation Status: discharge medications reconciled, continue medications without change  Current Outpatient Medications   Medication Sig Dispense Refill    acetaminophen (TYLENOL) 500 MG tablet Take 1,000 mg by mouth 4 times daily.      albuterol (PROAIR HFA/PROVENTIL HFA/VENTOLIN HFA) 108 (90 Base) MCG/ACT inhaler Inhale 2 puffs into the lungs 4 times daily as needed for shortness of breath, wheezing or cough.      allopurinol (ZYLOPRIM) 300 MG tablet Take 300 mg by mouth daily.      amitriptyline (ELAVIL) 25 MG tablet Take 25 mg by mouth at bedtime.      aspirin (ASA) 325 MG EC tablet Take 325 mg by mouth daily.      atorvastatin (LIPITOR) 80 MG tablet Take 80 mg by mouth at bedtime.      benazepril (LOTENSIN) 40 MG tablet Take 40 mg by mouth every morning.      buprenorphine (SUBUTEX) 2 MG SUBL sublingual tablet Place 2 mg under the tongue daily.      busPIRone (BUSPAR) 15 MG tablet Take 15 mg by mouth 2 times daily.      celecoxib (CELEBREX) 200 MG capsule Take 200 mg by mouth 2 times daily.      diazepam (VALIUM) 5 MG tablet Take 5 mg by mouth 2 times daily as needed for anxiety.      DULoxetine (CYMBALTA) 60 MG capsule Take 60 mg by mouth at bedtime.      fenofibrate (TRICOR) 145 MG tablet Take 145 mg by mouth daily.      gabapentin (NEURONTIN) 300 MG capsule Take 600 mg by mouth 3 times daily.      guaiFENesin (MUCINEX) 600 MG 12 hr tablet Take 1,200 mg by mouth 2 times daily.      insulin aspart (NOVOLOG PEN) 100 UNIT/ML pen Inject subcutaneously 3 times daily (with meals). Per Sliding Scale;  If Blood Sugar is less than 70, call MD.  If Blood Sugar is 150 to 199, give 2 Units.  If Blood Sugar is 200 to 249, give 4 Units.  If Blood Sugar is 250 to 299, give 6 Units.  If  "Blood Sugar is 300 to 349, give 8 Units.  If Blood Sugar is 350 to 399, give 10 Units.  If Blood Sugar is 400 to 499, give 12 Units.      insulin glargine (LANTUS PEN) 100 UNIT/ML pen Inject 40 Units subcutaneously at bedtime.      levothyroxine (SYNTHROID/LEVOTHROID) 75 MCG tablet Take 75 mcg by mouth every morning.      methocarbamol (ROBAXIN) 750 MG tablet Take 750 mg by mouth 4 times daily.      metoprolol tartrate (LOPRESSOR) 100 MG tablet Take 100 mg by mouth 2 times daily.      morphine (MS CONTIN) 15 MG CR tablet Take 30 mg by mouth every 12 hours.      morphine (MSIR) 15 MG IR tablet Take 15 mg by mouth 4 times daily as needed for severe pain.      naloxone (NARCAN) 4 MG/0.1ML nasal spray Spray 4 mg into one nostril alternating nostrils as needed for opioid reversal. every 2-3 minutes until assistance arrives      nicotine (NICODERM CQ) 21 MG/24HR 24 hr patch Place 1 patch onto the skin every 24 hours.      omeprazole (PRILOSEC) 20 MG DR capsule Take 20 mg by mouth daily.      polyethylene glycol (MIRALAX) 17 g packet Take 17 g by mouth daily as needed for constipation.      senna (SENOKOT) 8.6 MG tablet Take 1 tablet by mouth daily as needed for constipation.      senna-docusate (SENOKOT-S/PERICOLACE) 8.6-50 MG tablet Take 2 tablets by mouth 2 times daily.      tamsulosin (FLOMAX) 0.4 MG capsule Take 0.4 mg by mouth daily.       No current facility-administered medications for this visit.       REVIEW OF SYSTEMS:   10 point review of systems reviewed and pertinent positives in the HPI.     PHYSICAL EXAMINATION:  Physical Exam     Vital signs: /75   Pulse 65   Temp (!) 95.6  F (35.3  C)   Resp 20   Ht 1.994 m (6' 6.5\")   Wt 147.4 kg (325 lb)   SpO2 99%   BMI 37.08 kg/m    General: Awake, Alert, oriented x3, lying in bed, appropriately, follows simple commands, conversant  HEENT: Pink conjunctiva, moist oral mucosa  NECK: Supple  CVS:  S1  S2, without murmur or gallop.   LUNG: Clear to " auscultation, No wheezes, rales or rhonci.  BACK: Dressing intact to incision from the lumbar spine to top of thoracic spine.   ABDOMEN: Soft, obese, with positive bowel sounds.  Abdominal incision intact with Steri-Strips.  No drainage.  EXTREMITIES: Good range of motion on both upper and lower extremities, no pedal edema, no calf tenderness  SKIN: Right great toe with small ulcer.  No drainage.  No redness or warmth.  NEUROLOGIC: Intact, pulses palpable  PSYCHIATRIC: Cognition intact      Labs:  All labs reviewed in the nursing home record and Epic   @  Lab Results   Component Value Date    WBC 7.9 09/13/2024     Lab Results   Component Value Date    RBC 3.48 09/13/2024     Lab Results   Component Value Date    HGB 10.1 09/13/2024     Lab Results   Component Value Date    HCT 30.6 09/13/2024     Lab Results   Component Value Date    MCV 88 09/13/2024     Lab Results   Component Value Date    MCH 29.0 09/13/2024     Lab Results   Component Value Date    MCHC 33.0 09/13/2024     Lab Results   Component Value Date    RDW 16.7 09/13/2024     Lab Results   Component Value Date     09/13/2024        @Last Comprehensive Metabolic Panel:  Sodium   Date Value Ref Range Status   09/13/2024 136 135 - 145 mmol/L Final     Potassium   Date Value Ref Range Status   09/13/2024 4.0 3.4 - 5.3 mmol/L Final     Chloride   Date Value Ref Range Status   09/13/2024 101 98 - 107 mmol/L Final     Carbon Dioxide (CO2)   Date Value Ref Range Status   09/13/2024 26 22 - 29 mmol/L Final     Anion Gap   Date Value Ref Range Status   09/13/2024 9 7 - 15 mmol/L Final     Glucose   Date Value Ref Range Status   09/13/2024 184 (H) 70 - 99 mg/dL Final     Urea Nitrogen   Date Value Ref Range Status   09/13/2024 11.8 6.0 - 20.0 mg/dL Final     Creatinine   Date Value Ref Range Status   09/13/2024 0.62 (L) 0.67 - 1.17 mg/dL Final     GFR Estimate   Date Value Ref Range Status   09/13/2024 >90 >60 mL/min/1.73m2 Final     Comment:     eGFR  calculated using 2021 CKD-EPI equation.     Calcium   Date Value Ref Range Status   09/13/2024 8.7 (L) 8.8 - 10.4 mg/dL Final     Comment:     Reference intervals for this test were updated on 7/16/2024 to reflect our healthy population more accurately. There may be differences in the flagging of prior results with similar values performed with this method. Those prior results can be interpreted in the context of the updated reference intervals.     60 minutes spent preparing for this visit as well as face-to-face time spent with patient reviewing medications, pain management, imaging, labs as well as speaking with patient and his wife and collaborating with nursing staff.      This note has been dictated using voice recognition software. Any grammatical or context distortions are unintentional and inherent to the software    Electronically signed by: Liliane Pratt CNP

## 2024-09-16 NOTE — PROGRESS NOTES
ANTICOAGULATION MANAGEMENT     Luiz Matthews 49 year old male is on warfarin with therapeutic INR result. (Goal INR 2.0-3.0)    Recent labs: (last 7 days)     09/16/24  1237   INR 2.4       ASSESSMENT     Source(s): Chart Review and Home Care/Facility Nurse     Warfarin doses taken: Warfarin taken as instructed  Diet: No new diet changes identified  Medication/supplement changes: None noted  New illness, injury, or hospitalization: No  Signs or symptoms of bleeding or clotting: No  Previous result: Therapeutic last visit; previously outside of goal range  Additional findings:  recent surgery.  Has pain. No other changes.        PLAN     Recommended plan for temporary change(s) and ongoing change(s) affecting INR     Dosing Instructions: Continue your current warfarin dose with next INR in 3 days       Summary  As of 9/16/2024      Full warfarin instructions:  3 mg every day   Next INR check:  9/19/2024               Telephone call with [facility] facility nurse who agrees to plan and repeated back plan correctly    Orders given to  Homecare nurse/facility to recheck    Education provided: Please call back if any changes to your diet, medications or how you've been taking warfarin    Plan made per [facility] anticoagulation protocol    Tara Guillermo RN  9/16/2024  Anticoagulation Clinic  [employer] for routing messages: p [facility] (TCU/LTC/[facility])  [facility] patient phone line: [phone]        _______________________________________________________________________     Anticoagulation Episode Summary       Current INR goal:  2.0-3.0   TTR:  --   Target end date:  Indefinite   Send INR reminders to:  [facility] (TCU/LTC/[facility])    Indications    Deep vein thrombosis (DVT) (H) [I82.409]  DVT (deep venous thrombosis) (H) [I82.409]             Comments:  [facility]   [phone]                 Anticoagulation Care Providers       Provider Role Specialty Phone number    Liliane Pratt  NP Referring Nurse Practitioner [phone]

## 2024-09-17 DIAGNOSIS — M48.07 SPINAL STENOSIS OF LUMBOSACRAL REGION: ICD-10-CM

## 2024-09-17 DIAGNOSIS — G89.29 CHRONIC PAIN: Primary | ICD-10-CM

## 2024-09-17 RX ORDER — MORPHINE SULFATE 15 MG/1
15 TABLET ORAL 4 TIMES DAILY PRN
Qty: 30 TABLET | Refills: 0 | Status: SHIPPED | OUTPATIENT
Start: 2024-09-17 | End: 2024-09-24

## 2024-09-17 RX ORDER — BUPRENORPHINE 2 MG/1
2 TABLET SUBLINGUAL DAILY
Qty: 14 TABLET | Refills: 0 | Status: SHIPPED | OUTPATIENT
Start: 2024-09-17

## 2024-09-19 ENCOUNTER — TRANSITIONAL CARE UNIT VISIT (OUTPATIENT)
Dept: GERIATRICS | Facility: CLINIC | Age: 49
End: 2024-09-19

## 2024-09-19 ENCOUNTER — MEDICAL CORRESPONDENCE (OUTPATIENT)
Dept: HEALTH INFORMATION MANAGEMENT | Facility: CLINIC | Age: 49
End: 2024-09-19

## 2024-09-19 ENCOUNTER — ANTICOAGULATION THERAPY VISIT (OUTPATIENT)
Dept: ANTICOAGULATION | Facility: CLINIC | Age: 49
End: 2024-09-19

## 2024-09-19 VITALS
HEIGHT: 78 IN | RESPIRATION RATE: 16 BRPM | OXYGEN SATURATION: 97 % | HEART RATE: 71 BPM | BODY MASS INDEX: 36.45 KG/M2 | TEMPERATURE: 98.1 F | WEIGHT: 315 LBS | SYSTOLIC BLOOD PRESSURE: 156 MMHG | DIASTOLIC BLOOD PRESSURE: 91 MMHG

## 2024-09-19 DIAGNOSIS — Z79.4 TYPE 2 DIABETES MELLITUS WITH HYPEROSMOLARITY WITHOUT COMA, WITH LONG-TERM CURRENT USE OF INSULIN (H): ICD-10-CM

## 2024-09-19 DIAGNOSIS — E11.00 TYPE 2 DIABETES MELLITUS WITH HYPEROSMOLARITY WITHOUT COMA, WITH LONG-TERM CURRENT USE OF INSULIN (H): ICD-10-CM

## 2024-09-19 DIAGNOSIS — I82.409 DEEP VEIN THROMBOSIS (DVT) (H): Primary | ICD-10-CM

## 2024-09-19 DIAGNOSIS — Z86.718 PERSONAL HISTORY OF DVT (DEEP VEIN THROMBOSIS): ICD-10-CM

## 2024-09-19 DIAGNOSIS — G60.3 IDIOPATHIC PROGRESSIVE NEUROPATHY: ICD-10-CM

## 2024-09-19 DIAGNOSIS — G89.4 CHRONIC PAIN SYNDROME: Primary | ICD-10-CM

## 2024-09-19 DIAGNOSIS — G89.29 CHRONIC LOW BACK PAIN WITH SCIATICA, SCIATICA LATERALITY UNSPECIFIED, UNSPECIFIED BACK PAIN LATERALITY: ICD-10-CM

## 2024-09-19 DIAGNOSIS — N40.0 BENIGN PROSTATIC HYPERPLASIA WITHOUT LOWER URINARY TRACT SYMPTOMS: ICD-10-CM

## 2024-09-19 DIAGNOSIS — Z98.1 S/P LUMBAR SPINAL FUSION: ICD-10-CM

## 2024-09-19 DIAGNOSIS — G47.30 SLEEP APNEA, UNSPECIFIED TYPE: ICD-10-CM

## 2024-09-19 DIAGNOSIS — M48.07 SPINAL STENOSIS OF LUMBOSACRAL REGION: ICD-10-CM

## 2024-09-19 DIAGNOSIS — I10 BENIGN ESSENTIAL HYPERTENSION: ICD-10-CM

## 2024-09-19 DIAGNOSIS — I82.409 DVT (DEEP VENOUS THROMBOSIS) (H): ICD-10-CM

## 2024-09-19 DIAGNOSIS — M54.40 CHRONIC LOW BACK PAIN WITH SCIATICA, SCIATICA LATERALITY UNSPECIFIED, UNSPECIFIED BACK PAIN LATERALITY: ICD-10-CM

## 2024-09-19 LAB — INR (EXTERNAL): 2.7

## 2024-09-19 PROCEDURE — 99309 SBSQ NF CARE MODERATE MDM 30: CPT | Performed by: NURSE PRACTITIONER

## 2024-09-19 NOTE — PROGRESS NOTES
Incoming fax from [facility]    Date of result 9/19/24    INR result 2.7    Route result to: [facility] (TCU/LTC/[facility])

## 2024-09-19 NOTE — LETTER
9/19/2024      Luiz Matthews  [address] [facility] [address]        [facility]    Code Status:  FULL CODE   Visit Type:   Chief Complaint   Patient presents with     TCU Follow Up     Facility:  [facility] ([facility]) [[id number]]         HPI: Luiz Matthews is a 49 year old male who I am seeing today for follow up on  the TCU. Past medical history includes chronic back pain with radiculopathy, chronic opioid use, history of DVT on warfarin, hypertension, dyslipidemia, GERD with history of gastric ulcer, obesity, obstructive sleep apnea, depression with anxiety, BPH, hypothyroidism, gout, tobacco abuse, EtOH history in remission, thrombocytopenia and type 2 diabetes on insulin.  Patient with a history of spinal stenosis of the lumbar sacral region.  He is underwent surgery with a failed decompression in the past.  Patient underwent anterior spinal fusion of L1-S1 and posterior spinal fusion with instrumentation of T4-S1.  He had a large blood loss volume of 4050 cc requiring 1 unit of fresh frozen plasma.  History of chronic pain syndrome.  He was on oxycodone, methocarbamol, diazepam, amitriptyline and hydroxyzine at home.  He was seen by the pain team several times during his hospitalization and ultimately was placed on long-acting and short acting morphine.  Also on buprenorphine. He continues on methocarbamol and diazepam.  He was followed outpatient by pain clinic.  TLSO splinting out of bed.  History of major depression with anxiety.  Continues on buspirone, amitriptyline and duloxetine.  He is not on his hydroxyzine due to prolonged QTc during hospitalization.  Obstructive sleep apnea on CPAP.  BPH on Flomax.  Hypertension.  Metoprolol increased during hospitalization.  His Lasix and chlorthalidone continue to be on hold due to poor oral intake.  He is also on benazepril.  History of DVT back in his 20s while being a .  He had an IVC filter placed on 9//24  preventatively before surgery.  Continues on warfarin.    Transitional Care Course: Today patient lying in bed. Incisions to posterior and anterior spine intact. He reports pain from left SI joint radiating down to his right hip and groin. He continues on Methocarbamol, MS contin, Morphine IR, buprenorphine, amitriptyline, gabapentin and Valium. He is ambulating with rolling walker. OOB encouraged. He is eating well. DM2 on Lantus and Ozempic. On chronic AC with Warfarin. INR today 2.7. He reports he is having regular bowel movements and emptying his bladder. He denies any SOB or CP.     Assessment/Plan:     Chronic low back pain with sciatica, sciatica laterality unspecified, unspecified back pain laterality  S/P lumbar spinal fusion  Chronic pain syndrome  -Status post anterior spinal interbody fusion L1-S1; status post posterior spine fusion with instrumentation T4-S1.  -Encourage out of bed activity.  -TLSO OOB.   -Continue methocarbamol 750 mg every 6 hours  -Continue MS Contin 30 mg every 12 hours. (Attempt to wean)   -Continue morphine immediate release 15 mg every 4 hours as needed.  Every 4 hours pain assessment.  -Diazepam 5 mg twice daily as needed.  -Celebrex 200 mg 3 times daily ending on 9/19/2024.  -Continue buprenorphine 2 mg daily.  -Muscle rub TID.   -PT to eval for pain modalities.       Idiopathic progressive neuropathy  -Continue gabapentin 600 mg 3 times daily.    Type 2 diabetes mellitus with hyperosmolarity without coma, with long-term current use of insulin (H)  -Consistent carb diet.  -Accu-Cheks 4 times daily.  -Increase Lantus to 44 units nightly.  -Continue sliding scale with meals 3 times daily.  -Currently off Ozempic, hold the glimepiride and metformin.   -Monitor BS and make adjustments as necessary.     Sleep apnea, unspecified type  -Continue CPAP.    Benign essential hypertension  -Currently his home chlorthalidone and Lasix are being held. (Home dose of Lasix was 40 mg in the  a.m.).   -Metoprolol 100 mg twice daily.  -Benazepril 40 mg daily  -Monitor BP    Personal history of DVT (deep vein thrombosis)  -IVC filter placed on 9//24.  -Continue warfarin.  -INR today 2.7.  INR clinic managing Coumadin.  -Her IR filter can be retrievable or permanent.  Set up filter removal 1 is no longer needed through IR.    Benign prostatic hyperplasia without lower urinary tract symptoms  -Flomax 0.4 mg nightly.    Depression with anxiety  -Continue prior to admit buspirone 15 mg twice daily.  -Continue prior to admit amitriptyline 25 mg nightly.  -Continue prior to admit duloxetine 60 mg nightly.  -Patient currently not on hydroxyzine but was receiving hydroxyzine 25 mg every 8 hours as needed at home.    Acute on chronic anemia  Thrombocytopenia  -Hemoglobin 10.1.  -Platelets slowly trending down from baseline of 219.  Last platelets 148.  -Mild AST elevation but improving most likely to poor perfusion.    QT prolongation  -On multiple sedating and QT prolonging meds at baseline including amitriptyline, buprenorphine as well as pantoprazole and furosemide.  -Lasix and chlorthalidone continue to be on hold.  -EKG showed possible new right bundle branch block with ST depression and some chest leads on 9/6.  No past TTE in system.  Patient underwent TTE, CT CA and echo which were reassuring.  -Last QTc 510.     Slow transit constipation  -Continue senna twice daily.    History of tobacco abuse  -Smoked 2 packs/day.  -Continue nicotine patch.      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     DVT (deep venous thrombosis) (H) 09/08/2024     Dyslipidemia 11/17/2018     Essential hypertension 11/17/2018     Hypertension 09/13/2024     CORIE (generalized anxiety disorder) 09/08/2024     Gastric ulcer 03/14/2019     Gout 09/08/2024     Heartburn 09/13/2024     Sexually transmitted infection 09/13/2024      Post-operative pain 09/08/2024     Peripheral neuropathy 03/14/2019     Migraines 03/14/2019     Major depression 09/08/2024     Hyponatremia 09/08/2024     Hypercholesterolemia 03/14/2019     Sleep apnea 03/14/2019     Myelopathy concurrent with and due to stenosis of lumbar spine (H) 09/13/2024     Spinal stenosis of lumbosacral region 09/06/2024     DM2 (diabetes mellitus, type 2) (H) 09/08/2024     Type 2 diabetes mellitus with complication, without long-term current use of insulin (H) 11/17/2018     Erectile dysfunction 09/13/2024     Deep vein thrombosis (DVT) (H) 09/13/2024     Resolved Ambulatory Problems     Diagnosis Date Noted     No Resolved Ambulatory Problems     No Additional Past Medical History     Allergies   Allergen Reactions     Penicillins Hives and Swelling     Empagliflozin Other (See Comments)     Hypotension       All Meds and Allergies reviewed in the record at the facility and is the most up-to-date.    Current Outpatient Medications   Medication Sig Dispense Refill     camphor-menthol-methyl sal 4-10-30 % CREA Apply topically 3 times daily.       acetaminophen (TYLENOL) 500 MG tablet Take 1,000 mg by mouth 4 times daily.       albuterol (PROAIR HFA/PROVENTIL HFA/VENTOLIN HFA) 108 (90 Base) MCG/ACT inhaler Inhale 2 puffs into the lungs 4 times daily as needed for shortness of breath, wheezing or cough.       allopurinol (ZYLOPRIM) 300 MG tablet Take 300 mg by mouth daily.       amitriptyline (ELAVIL) 25 MG tablet Take 25 mg by mouth at bedtime.       aspirin (ASA) 325 MG EC tablet Take 325 mg by mouth daily.       atorvastatin (LIPITOR) 80 MG tablet Take 80 mg by mouth at bedtime.       benazepril (LOTENSIN) 40 MG tablet Take 40 mg by mouth every morning.       buprenorphine (SUBUTEX) 2 MG SUBL sublingual tablet Place 1 tablet (2 mg) under the tongue daily. 14 tablet 0     busPIRone (BUSPAR) 15 MG tablet Take 15 mg by mouth 2 times daily.       celecoxib (CELEBREX) 200 MG capsule Take 200  mg by mouth 2 times daily.       diazepam (VALIUM) 5 MG tablet Take 5 mg by mouth 2 times daily as needed for anxiety.       DULoxetine (CYMBALTA) 60 MG capsule Take 60 mg by mouth at bedtime.       fenofibrate (TRICOR) 145 MG tablet Take 145 mg by mouth daily.       gabapentin (NEURONTIN) 300 MG capsule Take 600 mg by mouth 3 times daily.       guaiFENesin (MUCINEX) 600 MG 12 hr tablet Take 1,200 mg by mouth 2 times daily.       insulin aspart (NOVOLOG PEN) 100 UNIT/ML pen Inject subcutaneously 3 times daily (with meals). Per Sliding Scale;  If Blood Sugar is less than 70, call MD.  If Blood Sugar is 150 to 199, give 2 Units.  If Blood Sugar is 200 to 249, give 4 Units.  If Blood Sugar is 250 to 299, give 6 Units.  If Blood Sugar is 300 to 349, give 8 Units.  If Blood Sugar is 350 to 399, give 10 Units.  If Blood Sugar is 400 to 499, give 12 Units.       insulin glargine (LANTUS PEN) 100 UNIT/ML pen Inject 40 Units subcutaneously at bedtime.       levothyroxine (SYNTHROID/LEVOTHROID) 75 MCG tablet Take 75 mcg by mouth every morning.       methocarbamol (ROBAXIN) 750 MG tablet Take 750 mg by mouth 4 times daily.       metoprolol tartrate (LOPRESSOR) 100 MG tablet Take 100 mg by mouth 2 times daily.       morphine (MS CONTIN) 15 MG CR tablet Take 30 mg by mouth every 12 hours.       morphine (MSIR) 15 MG IR tablet Take 1 tablet (15 mg) by mouth 4 times daily as needed for severe pain. 30 tablet 0     naloxone (NARCAN) 4 MG/0.1ML nasal spray Spray 4 mg into one nostril alternating nostrils as needed for opioid reversal. every 2-3 minutes until assistance arrives       nicotine (NICODERM CQ) 21 MG/24HR 24 hr patch Place 1 patch onto the skin every 24 hours.       omeprazole (PRILOSEC) 20 MG DR capsule Take 20 mg by mouth daily.       polyethylene glycol (MIRALAX) 17 g packet Take 17 g by mouth daily as needed for constipation.       senna (SENOKOT) 8.6 MG tablet Take 1 tablet by mouth daily as needed for constipation.   "     senna-docusate (SENOKOT-S/PERICOLACE) 8.6-50 MG tablet Take 2 tablets by mouth 2 times daily.       tamsulosin (FLOMAX) 0.4 MG capsule Take 0.4 mg by mouth daily.       No current facility-administered medications for this visit.       REVIEW OF SYSTEMS:   10 point review of systems reviewed and pertinent positives in the HPI.     PHYSICAL EXAMINATION:  Physical Exam     Vital signs: BP (!) 156/91   Pulse 71   Temp 98.1  F (36.7  C)   Resp 16   Ht 1.994 m (6' 6.5\")   Wt 147.4 kg (325 lb)   SpO2 97%   BMI 37.08 kg/m    General: Awake, Alert, oriented x3, lying in bed, appropriately, follows simple commands, conversant  HEENT: Pink conjunctiva, moist oral mucosa  NECK: Supple  CVS:  S1  S2, without murmur or gallop.   LUNG: Clear to auscultation, No wheezes, rales or rhonci.  BACK: Dressing intact to incision from the lumbar spine to top of thoracic spine. Tenderness at the right SI joint with palpitation.   ABDOMEN: Soft, obese, with positive bowel sounds.  Abdominal incision intact with Steri-Strips.  No drainage.  EXTREMITIES: Good range of motion on both upper and lower extremities, no pedal edema, no calf tenderness  SKIN: Right great toe with small ulcer covered.   NEUROLOGIC: Intact, pulses palpable  PSYCHIATRIC: Cognition intact      Labs:  All labs reviewed in the nursing home record and Epic   @  Lab Results   Component Value Date    WBC 7.9 09/13/2024     Lab Results   Component Value Date    RBC 3.48 09/13/2024     Lab Results   Component Value Date    HGB 10.1 09/13/2024     Lab Results   Component Value Date    HCT 30.6 09/13/2024     Lab Results   Component Value Date    MCV 88 09/13/2024     Lab Results   Component Value Date    MCH 29.0 09/13/2024     Lab Results   Component Value Date    MCHC 33.0 09/13/2024     Lab Results   Component Value Date    RDW 16.7 09/13/2024     Lab Results   Component Value Date     09/13/2024        @Last Comprehensive Metabolic Panel:  Sodium   Date " Value Ref Range Status   09/13/2024 136 135 - 145 mmol/L Final     Potassium   Date Value Ref Range Status   09/13/2024 4.0 3.4 - 5.3 mmol/L Final     Chloride   Date Value Ref Range Status   09/13/2024 101 98 - 107 mmol/L Final     Carbon Dioxide (CO2)   Date Value Ref Range Status   09/13/2024 26 22 - 29 mmol/L Final     Anion Gap   Date Value Ref Range Status   09/13/2024 9 7 - 15 mmol/L Final     Glucose   Date Value Ref Range Status   09/13/2024 184 (H) 70 - 99 mg/dL Final     Urea Nitrogen   Date Value Ref Range Status   09/13/2024 11.8 6.0 - 20.0 mg/dL Final     Creatinine   Date Value Ref Range Status   09/13/2024 0.62 (L) 0.67 - 1.17 mg/dL Final     GFR Estimate   Date Value Ref Range Status   09/13/2024 >90 >60 mL/min/1.73m2 Final     Comment:     eGFR calculated using 2021 CKD-EPI equation.     Calcium   Date Value Ref Range Status   09/13/2024 8.7 (L) 8.8 - 10.4 mg/dL Final     Comment:     Reference intervals for this test were updated on 7/16/2024 to reflect our healthy population more accurately. There may be differences in the flagging of prior results with similar values performed with this method. Those prior results can be interpreted in the context of the updated reference intervals.       This note has been dictated using voice recognition software. Any grammatical or context distortions are unintentional and inherent to the software    Electronically signed by: Liliane Pratt CNP       Sincerely,        Liliane Pratt, NP

## 2024-09-19 NOTE — PROGRESS NOTES
ANTICOAGULATION MANAGEMENT     Luiz Matthews 49 year old male is on warfarin with therapeutic INR result. (Goal INR 2.0-3.0)    Recent labs: (last 7 days)     09/19/24  0000   INR 2.7       ASSESSMENT     Source(s): Chart Review and Home Care/Facility Nurse     Warfarin doses taken: Warfarin taken as instructed  Diet: No new diet changes identified  Medication/supplement changes:   mg has been taken since admission 9/13/24  New illness, injury, or hospitalization: No  Signs or symptoms of bleeding or clotting: No  Previous result: Therapeutic last 2(+) visits  Additional findings: None       PLAN     Recommended plan for no diet, medication or health factor changes affecting INR     Dosing Instructions: Continue your current warfarin dose with next INR in 4 days       Summary  As of 9/19/2024      Full warfarin instructions:  3 mg every day   Next INR check:  9/23/2024               Telephone call with [facility] nurse (medical care for seniors) who verbalizes understanding and agrees to plan    Orders given to  Homecare nurse/facility to recheck    Education provided: Please call back if any changes to your diet, medications or how you've been taking warfarin    Plan made per [facility] anticoagulation protocol    Sanjuanita Chahal RN  9/19/2024  Anticoagulation Clinic  RECCY for routing messages: [facility] (TCU/LTC/long term)  [facility] patient phone line: [phone]        _______________________________________________________________________     Anticoagulation Episode Summary       Current INR goal:  2.0-3.0   TTR:  --   Target end date:  Indefinite   Send INR reminders to:  [facility] (TCU/LTC/RICK)    Indications    Deep vein thrombosis (DVT) (H) [I82.409]  DVT (deep venous thrombosis) (H) [I82.409]             Comments:  [facility]   [phone]                 Anticoagulation Care Providers       Provider Role Specialty Phone number    Liliane Pratt NP  Referring Nurse Practitioner [phone]

## 2024-09-20 DIAGNOSIS — R52 PAIN: Primary | ICD-10-CM

## 2024-09-20 RX ORDER — MORPHINE SULFATE 30 MG/1
30 TABLET, FILM COATED, EXTENDED RELEASE ORAL EVERY 12 HOURS
Qty: 30 TABLET | Refills: 0 | Status: SHIPPED | OUTPATIENT
Start: 2024-09-20 | End: 2024-10-03

## 2024-09-20 RX ORDER — MORPHINE SULFATE 30 MG/1
30 TABLET, FILM COATED, EXTENDED RELEASE ORAL EVERY 12 HOURS
COMMUNITY
End: 2024-09-20

## 2024-09-20 NOTE — PROGRESS NOTES
[facility] GERIATRIC SERVICES    Code Status:  FULL CODE   Visit Type:   Chief Complaint   Patient presents with    TCU Follow Up     Facility:  [facility] ([facility]) [[id number]]         HPI: Luiz Matthews is a 49 year old male who I am seeing today for follow up on  the TCU. Past medical history includes chronic back pain with radiculopathy, chronic opioid use, history of DVT on warfarin, hypertension, dyslipidemia, GERD with history of gastric ulcer, obesity, obstructive sleep apnea, depression with anxiety, BPH, hypothyroidism, gout, tobacco abuse, EtOH history in remission, thrombocytopenia and type 2 diabetes on insulin.  Patient with a history of spinal stenosis of the lumbar sacral region.  He is underwent surgery with a failed decompression in the past.  Patient underwent anterior spinal fusion of L1-S1 and posterior spinal fusion with instrumentation of T4-S1.  He had a large blood loss volume of 4050 cc requiring 1 unit of fresh frozen plasma.  History of chronic pain syndrome.  He was on oxycodone, methocarbamol, diazepam, amitriptyline and hydroxyzine at home.  He was seen by the pain team several times during his hospitalization and ultimately was placed on long-acting and short acting morphine.  Also on buprenorphine. He continues on methocarbamol and diazepam.  He was followed outpatient by pain clinic.  TLSO splinting out of bed.  History of major depression with anxiety.  Continues on buspirone, amitriptyline and duloxetine.  He is not on his hydroxyzine due to prolonged QTc during hospitalization.  Obstructive sleep apnea on CPAP.  BPH on Flomax.  Hypertension.  Metoprolol increased during hospitalization.  His Lasix and chlorthalidone continue to be on hold due to poor oral intake.  He is also on benazepril.  History of DVT back in his 20s while being a .  He had an IVC filter placed on 9//24 preventatively before surgery.  Continues on warfarin.    Transitional Care Course: Today  patient lying in bed. Incisions to posterior and anterior spine intact. He reports pain from left SI joint radiating down to his right hip and groin. He continues on Methocarbamol, MS contin, Morphine IR, buprenorphine, amitriptyline, gabapentin and Valium. He is ambulating with rolling walker. OOB encouraged. He is eating well. DM2 on Lantus and Ozempic. On chronic AC with Warfarin. INR today 2.7. He reports he is having regular bowel movements and emptying his bladder. He denies any SOB or CP.     Assessment/Plan:     Chronic low back pain with sciatica, sciatica laterality unspecified, unspecified back pain laterality  S/P lumbar spinal fusion  Chronic pain syndrome  -Status post anterior spinal interbody fusion L1-S1; status post posterior spine fusion with instrumentation T4-S1.  -Encourage out of bed activity.  -TLSO OOB.   -Continue methocarbamol 750 mg every 6 hours  -Continue MS Contin 30 mg every 12 hours. (Attempt to wean)   -Continue morphine immediate release 15 mg every 4 hours as needed.  Every 4 hours pain assessment.  -Diazepam 5 mg twice daily as needed.  -Celebrex 200 mg 3 times daily ending on 9/19/2024.  -Continue buprenorphine 2 mg daily.  -Muscle rub TID.   -PT to eval for pain modalities.       Idiopathic progressive neuropathy  -Continue gabapentin 600 mg 3 times daily.    Type 2 diabetes mellitus with hyperosmolarity without coma, with long-term current use of insulin (H)  -Consistent carb diet.  -Accu-Cheks 4 times daily.  -Increase Lantus to 44 units nightly.  -Continue sliding scale with meals 3 times daily.  -Currently off Ozempic, hold the glimepiride and metformin.   -Monitor BS and make adjustments as necessary.     Sleep apnea, unspecified type  -Continue CPAP.    Benign essential hypertension  -Currently his home chlorthalidone and Lasix are being held. (Home dose of Lasix was 40 mg in the a.m.).   -Metoprolol 100 mg twice daily.  -Benazepril 40 mg daily  -Monitor BP    Personal  history of DVT (deep vein thrombosis)  -IVC filter placed on 9//24.  -Continue warfarin.  -INR today 2.7.  INR clinic managing Coumadin.  -Her IR filter can be retrievable or permanent.  Set up filter removal 1 is no longer needed through IR.    Benign prostatic hyperplasia without lower urinary tract symptoms  -Flomax 0.4 mg nightly.    Depression with anxiety  -Continue prior to admit buspirone 15 mg twice daily.  -Continue prior to admit amitriptyline 25 mg nightly.  -Continue prior to admit duloxetine 60 mg nightly.  -Patient currently not on hydroxyzine but was receiving hydroxyzine 25 mg every 8 hours as needed at home.    Acute on chronic anemia  Thrombocytopenia  -Hemoglobin 10.1.  -Platelets slowly trending down from baseline of 219.  Last platelets 148.  -Mild AST elevation but improving most likely to poor perfusion.    QT prolongation  -On multiple sedating and QT prolonging meds at baseline including amitriptyline, buprenorphine as well as pantoprazole and furosemide.  -Lasix and chlorthalidone continue to be on hold.  -EKG showed possible new right bundle branch block with ST depression and some chest leads on 9/6.  No past TTE in system.  Patient underwent TTE, CT CA and echo which were reassuring.  -Last QTc 510.     Slow transit constipation  -Continue senna twice daily.    History of tobacco abuse  -Smoked 2 packs/day.  -Continue nicotine patch.      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    DVT (deep venous thrombosis) (H) 09/08/2024    Dyslipidemia 11/17/2018    Essential hypertension 11/17/2018    Hypertension 09/13/2024    CORIE (generalized anxiety disorder) 09/08/2024    Gastric ulcer 03/14/2019    Gout 09/08/2024    Heartburn 09/13/2024    Sexually transmitted infection 09/13/2024    Post-operative pain 09/08/2024    Peripheral neuropathy 03/14/2019    Migraines 03/14/2019    Major  depression 09/08/2024    Hyponatremia 09/08/2024    Hypercholesterolemia 03/14/2019    Sleep apnea 03/14/2019    Myelopathy concurrent with and due to stenosis of lumbar spine (H) 09/13/2024    Spinal stenosis of lumbosacral region 09/06/2024    DM2 (diabetes mellitus, type 2) (H) 09/08/2024    Type 2 diabetes mellitus with complication, without long-term current use of insulin (H) 11/17/2018    Erectile dysfunction 09/13/2024    Deep vein thrombosis (DVT) (H) 09/13/2024     Resolved Ambulatory Problems     Diagnosis Date Noted    No Resolved Ambulatory Problems     No Additional Past Medical History     Allergies   Allergen Reactions    Penicillins Hives and Swelling    Empagliflozin Other (See Comments)     Hypotension       All Meds and Allergies reviewed in the record at the facility and is the most up-to-date.    Current Outpatient Medications   Medication Sig Dispense Refill    camphor-menthol-methyl sal 4-10-30 % CREA Apply topically 3 times daily.      acetaminophen (TYLENOL) 500 MG tablet Take 1,000 mg by mouth 4 times daily.      albuterol (PROAIR HFA/PROVENTIL HFA/VENTOLIN HFA) 108 (90 Base) MCG/ACT inhaler Inhale 2 puffs into the lungs 4 times daily as needed for shortness of breath, wheezing or cough.      allopurinol (ZYLOPRIM) 300 MG tablet Take 300 mg by mouth daily.      amitriptyline (ELAVIL) 25 MG tablet Take 25 mg by mouth at bedtime.      aspirin (ASA) 325 MG EC tablet Take 325 mg by mouth daily.      atorvastatin (LIPITOR) 80 MG tablet Take 80 mg by mouth at bedtime.      benazepril (LOTENSIN) 40 MG tablet Take 40 mg by mouth every morning.      buprenorphine (SUBUTEX) 2 MG SUBL sublingual tablet Place 1 tablet (2 mg) under the tongue daily. 14 tablet 0    busPIRone (BUSPAR) 15 MG tablet Take 15 mg by mouth 2 times daily.      celecoxib (CELEBREX) 200 MG capsule Take 200 mg by mouth 2 times daily.      diazepam (VALIUM) 5 MG tablet Take 5 mg by mouth 2 times daily as needed for anxiety.       DULoxetine (CYMBALTA) 60 MG capsule Take 60 mg by mouth at bedtime.      fenofibrate (TRICOR) 145 MG tablet Take 145 mg by mouth daily.      gabapentin (NEURONTIN) 300 MG capsule Take 600 mg by mouth 3 times daily.      guaiFENesin (MUCINEX) 600 MG 12 hr tablet Take 1,200 mg by mouth 2 times daily.      insulin aspart (NOVOLOG PEN) 100 UNIT/ML pen Inject subcutaneously 3 times daily (with meals). Per Sliding Scale;  If Blood Sugar is less than 70, call MD.  If Blood Sugar is 150 to 199, give 2 Units.  If Blood Sugar is 200 to 249, give 4 Units.  If Blood Sugar is 250 to 299, give 6 Units.  If Blood Sugar is 300 to 349, give 8 Units.  If Blood Sugar is 350 to 399, give 10 Units.  If Blood Sugar is 400 to 499, give 12 Units.      insulin glargine (LANTUS PEN) 100 UNIT/ML pen Inject 40 Units subcutaneously at bedtime.      levothyroxine (SYNTHROID/LEVOTHROID) 75 MCG tablet Take 75 mcg by mouth every morning.      methocarbamol (ROBAXIN) 750 MG tablet Take 750 mg by mouth 4 times daily.      metoprolol tartrate (LOPRESSOR) 100 MG tablet Take 100 mg by mouth 2 times daily.      morphine (MS CONTIN) 15 MG CR tablet Take 30 mg by mouth every 12 hours.      morphine (MSIR) 15 MG IR tablet Take 1 tablet (15 mg) by mouth 4 times daily as needed for severe pain. 30 tablet 0    naloxone (NARCAN) 4 MG/0.1ML nasal spray Spray 4 mg into one nostril alternating nostrils as needed for opioid reversal. every 2-3 minutes until assistance arrives      nicotine (NICODERM CQ) 21 MG/24HR 24 hr patch Place 1 patch onto the skin every 24 hours.      omeprazole (PRILOSEC) 20 MG DR capsule Take 20 mg by mouth daily.      polyethylene glycol (MIRALAX) 17 g packet Take 17 g by mouth daily as needed for constipation.      senna (SENOKOT) 8.6 MG tablet Take 1 tablet by mouth daily as needed for constipation.      senna-docusate (SENOKOT-S/PERICOLACE) 8.6-50 MG tablet Take 2 tablets by mouth 2 times daily.      tamsulosin (FLOMAX) 0.4 MG capsule  "Take 0.4 mg by mouth daily.       No current facility-administered medications for this visit.       REVIEW OF SYSTEMS:   10 point review of systems reviewed and pertinent positives in the HPI.     PHYSICAL EXAMINATION:  Physical Exam     Vital signs: BP (!) 156/91   Pulse 71   Temp 98.1  F (36.7  C)   Resp 16   Ht 1.994 m (6' 6.5\")   Wt 147.4 kg (325 lb)   SpO2 97%   BMI 37.08 kg/m    General: Awake, Alert, oriented x3, lying in bed, appropriately, follows simple commands, conversant  HEENT: Pink conjunctiva, moist oral mucosa  NECK: Supple  CVS:  S1  S2, without murmur or gallop.   LUNG: Clear to auscultation, No wheezes, rales or rhonci.  BACK: Dressing intact to incision from the lumbar spine to top of thoracic spine. Tenderness at the right SI joint with palpitation.   ABDOMEN: Soft, obese, with positive bowel sounds.  Abdominal incision intact with Steri-Strips.  No drainage.  EXTREMITIES: Good range of motion on both upper and lower extremities, no pedal edema, no calf tenderness  SKIN: Right great toe with small ulcer covered.   NEUROLOGIC: Intact, pulses palpable  PSYCHIATRIC: Cognition intact      Labs:  All labs reviewed in the nursing home record and Epic   @  Lab Results   Component Value Date    WBC 7.9 09/13/2024     Lab Results   Component Value Date    RBC 3.48 09/13/2024     Lab Results   Component Value Date    HGB 10.1 09/13/2024     Lab Results   Component Value Date    HCT 30.6 09/13/2024     Lab Results   Component Value Date    MCV 88 09/13/2024     Lab Results   Component Value Date    MCH 29.0 09/13/2024     Lab Results   Component Value Date    MCHC 33.0 09/13/2024     Lab Results   Component Value Date    RDW 16.7 09/13/2024     Lab Results   Component Value Date     09/13/2024        @Last Comprehensive Metabolic Panel:  Sodium   Date Value Ref Range Status   09/13/2024 136 135 - 145 mmol/L Final     Potassium   Date Value Ref Range Status   09/13/2024 4.0 3.4 - 5.3 mmol/L " Final     Chloride   Date Value Ref Range Status   09/13/2024 101 98 - 107 mmol/L Final     Carbon Dioxide (CO2)   Date Value Ref Range Status   09/13/2024 26 22 - 29 mmol/L Final     Anion Gap   Date Value Ref Range Status   09/13/2024 9 7 - 15 mmol/L Final     Glucose   Date Value Ref Range Status   09/13/2024 184 (H) 70 - 99 mg/dL Final     Urea Nitrogen   Date Value Ref Range Status   09/13/2024 11.8 6.0 - 20.0 mg/dL Final     Creatinine   Date Value Ref Range Status   09/13/2024 0.62 (L) 0.67 - 1.17 mg/dL Final     GFR Estimate   Date Value Ref Range Status   09/13/2024 >90 >60 mL/min/1.73m2 Final     Comment:     eGFR calculated using 2021 CKD-EPI equation.     Calcium   Date Value Ref Range Status   09/13/2024 8.7 (L) 8.8 - 10.4 mg/dL Final     Comment:     Reference intervals for this test were updated on 7/16/2024 to reflect our healthy population more accurately. There may be differences in the flagging of prior results with similar values performed with this method. Those prior results can be interpreted in the context of the updated reference intervals.       This note has been dictated using voice recognition software. Any grammatical or context distortions are unintentional and inherent to the software    Electronically signed by: Liliane Pratt CNP

## 2024-09-23 ENCOUNTER — TRANSFERRED RECORDS (OUTPATIENT)
Dept: HEALTH INFORMATION MANAGEMENT | Facility: CLINIC | Age: 49
End: 2024-09-23

## 2024-09-23 ENCOUNTER — ANTICOAGULATION THERAPY VISIT (OUTPATIENT)
Dept: ANTICOAGULATION | Facility: CLINIC | Age: 49
End: 2024-09-23

## 2024-09-23 DIAGNOSIS — I82.409 DVT (DEEP VENOUS THROMBOSIS) (H): ICD-10-CM

## 2024-09-23 DIAGNOSIS — I82.409 DEEP VEIN THROMBOSIS (DVT) (H): Primary | ICD-10-CM

## 2024-09-23 LAB — INR (EXTERNAL): 2.3

## 2024-09-23 NOTE — PROGRESS NOTES
ANTICOAGULATION MANAGEMENT     Luiz Matthews 49 year old male is on warfarin with therapeutic INR result. (Goal INR 2.0-3.0)    Recent labs: (last 7 days)     09/23/24  0000   INR 2.3       ASSESSMENT     Source(s): Chart Review, Home Care/Facility Nurse, and Template     Warfarin doses taken: Warfarin taken as instructed  Diet: No new diet changes identified  Medication/supplement changes: None noted  New illness, injury, or hospitalization: No  Signs or symptoms of bleeding or clotting: No  Previous result: Therapeutic last 2(+) visits  Additional findings: None       PLAN     Recommended plan for no diet, medication or health factor changes affecting INR     Dosing Instructions: Continue your current warfarin dose with next INR in 1 week       Summary  As of 9/23/2024      Full warfarin instructions:  3 mg every day   Next INR check:  9/30/2024               Telephone call with [facility] nurse (medical care for seniors) who verbalizes understanding and agrees to plan    Orders given to  Homecare nurse/facility to recheck    Education provided: Please call back if any changes to your diet, medications or how you've been taking warfarin    Plan made per [facility] anticoagulation protocol    Sanjuanita Chahal RN  9/23/2024  Anticoagulation Clinic  [facility] for routing messages: [facility] (TCU/LTC/RICK)  [facility] patient phone line: [phone]        _______________________________________________________________________     Anticoagulation Episode Summary       Current INR goal:  2.0-3.0   TTR:  --   Target end date:  Indefinite   Send INR reminders to:  [facility] (TCU/LTC/correction)    Indications    Deep vein thrombosis (DVT) (H) [I82.409]  DVT (deep venous thrombosis) (H) [I82.409]             Comments:  [facility]   [phone]                 Anticoagulation Care Providers       Provider Role Specialty Phone number    Liliane Pratt NP Referring Nurse  Practitioner [phone]

## 2024-09-24 ENCOUNTER — TRANSITIONAL CARE UNIT VISIT (OUTPATIENT)
Dept: GERIATRICS | Facility: CLINIC | Age: 49
End: 2024-09-24

## 2024-09-24 VITALS
DIASTOLIC BLOOD PRESSURE: 67 MMHG | BODY MASS INDEX: 36.45 KG/M2 | HEIGHT: 78 IN | HEART RATE: 71 BPM | SYSTOLIC BLOOD PRESSURE: 137 MMHG | RESPIRATION RATE: 18 BRPM | TEMPERATURE: 97.3 F | WEIGHT: 315 LBS | OXYGEN SATURATION: 94 %

## 2024-09-24 DIAGNOSIS — F41.8 DEPRESSION WITH ANXIETY: Primary | ICD-10-CM

## 2024-09-24 DIAGNOSIS — M48.07 SPINAL STENOSIS OF LUMBOSACRAL REGION: Primary | ICD-10-CM

## 2024-09-24 DIAGNOSIS — Z98.1 S/P LUMBAR SPINAL FUSION: ICD-10-CM

## 2024-09-24 DIAGNOSIS — Z79.4 TYPE 2 DIABETES MELLITUS WITH HYPEROSMOLARITY WITHOUT COMA, WITH LONG-TERM CURRENT USE OF INSULIN (H): ICD-10-CM

## 2024-09-24 DIAGNOSIS — G60.3 IDIOPATHIC PROGRESSIVE NEUROPATHY: ICD-10-CM

## 2024-09-24 DIAGNOSIS — G89.4 CHRONIC PAIN SYNDROME: ICD-10-CM

## 2024-09-24 DIAGNOSIS — E11.00 TYPE 2 DIABETES MELLITUS WITH HYPEROSMOLARITY WITHOUT COMA, WITH LONG-TERM CURRENT USE OF INSULIN (H): ICD-10-CM

## 2024-09-24 DIAGNOSIS — F41.8 DEPRESSION WITH ANXIETY: ICD-10-CM

## 2024-09-24 DIAGNOSIS — M48.07 SPINAL STENOSIS OF LUMBOSACRAL REGION: ICD-10-CM

## 2024-09-24 PROCEDURE — 99309 SBSQ NF CARE MODERATE MDM 30: CPT | Performed by: NURSE PRACTITIONER

## 2024-09-24 RX ORDER — MORPHINE SULFATE 15 MG/1
15 TABLET ORAL 4 TIMES DAILY PRN
Qty: 30 TABLET | Refills: 0 | Status: SHIPPED | OUTPATIENT
Start: 2024-09-24

## 2024-09-24 RX ORDER — DIAZEPAM 5 MG
5 TABLET ORAL 2 TIMES DAILY PRN
Qty: 30 TABLET | Refills: 0 | Status: SHIPPED | OUTPATIENT
Start: 2024-09-24

## 2024-09-24 NOTE — LETTER
9/24/2024      Luiz Matthews  [address] [facility] [address]        [facility]    Code Status:  FULL CODE   Visit Type:   Chief Complaint   Patient presents with     TCU Follow Up     Facility:  [facility] ([facility]) [[id number]]         HPI: Luiz Matthews is a 49 year old male who I am seeing today for follow up on  the TCU. Past medical history includes chronic back pain with radiculopathy, chronic opioid use, history of DVT on warfarin, hypertension, dyslipidemia, GERD with history of gastric ulcer, obesity, obstructive sleep apnea, depression with anxiety, BPH, hypothyroidism, gout, tobacco abuse, EtOH history in remission, thrombocytopenia and type 2 diabetes on insulin.  Patient with a history of spinal stenosis of the lumbar sacral region.  He is underwent surgery with a failed decompression in the past.  Patient underwent anterior spinal fusion of L1-S1 and posterior spinal fusion with instrumentation of T4-S1.  He had a large blood loss volume of 4050 cc requiring 1 unit of fresh frozen plasma.  History of chronic pain syndrome.  He was on oxycodone, methocarbamol, diazepam, amitriptyline and hydroxyzine at home.  He was seen by the pain team several times during his hospitalization and ultimately was placed on long-acting and short acting morphine.  Also on buprenorphine. He continues on methocarbamol and diazepam.  He was followed outpatient by pain clinic.  TLSO splinting out of bed.  History of major depression with anxiety.  Continues on buspirone, amitriptyline and duloxetine.  He is not on his hydroxyzine due to prolonged QTc during hospitalization.  Obstructive sleep apnea on CPAP.  BPH on Flomax.  Hypertension.  Metoprolol increased during hospitalization.  His Lasix and chlorthalidone continue to be on hold due to poor oral intake.  He is also on benazepril.  History of DVT back in his 20s while being a .  He had an IVC filter placed on 9//24  preventatively before surgery.  Continues on warfarin.    Transitional Care Course: Today patient lying in bed. Pt s/p posterior and interior spinal fusion with instrumentation. Incisions to posterior and anterior spine intact. No redness or warmth. Pt continues to report pain from right SI joint radiating to right hip and groin. He feels tenderness or SI joint with palpitation. He continues on MS contin 30 mg BID and Morphine IR 15 mg QID. He is also receiving gabapentin 600 mg TID, diazepam BID prn, buprenorphine and amitriptyline. He has hx of chronic pain syndrome and is followed out pt by pain clinic in [place]. Today he is upset on exam feels he is not improving. We were able to set up an appt with his surgeon for Thursday. He has received E stim in therapy however feel it was not helpful. He has tried his massager and is was not helpful. He is ambulating with rolling walker. He denies any numbness or tingling. He is moving all extremities. He also tried muscle rub with no improvement. Today I offered going back to hospital for pain management and he declines. He would like to go home. He misses his kids and wife.  is working on transfer. However he also reports he does not want to go to soon and have problems. He is having regular bowel movements. He denies any SOB or Cp. DM2. Continues on Lantus and Ozempic.       Assessment/Plan:     Chronic low back pain with sciatica, sciatica laterality unspecified, unspecified back pain laterality  S/P lumbar spinal fusion  Chronic pain syndrome  -Status post anterior spinal interbody fusion L1-S1; status post posterior spine fusion with instrumentation T4-S1.  -Encourage out of bed activity.  -TLSO OOB.   -Continue methocarbamol 750 mg every 6 hours  -Continue MS Contin 30 mg every 12 hours. (Attempt to wean)   -Continue morphine immediate release 15 mg QID prn.   Every 4 hours pain assessment.  -Diazepam 5 mg twice daily as needed.  -Celebrex 200 mg 3 times  daily ending on 9/19/2024. Reinstate Celebrex.   -Continue buprenorphine 2 mg daily.  -Muscle rub TID.   -PT to eval for pain modalities.   -Follow up with Neurosurgeon on Thursday.   -Lidocaine patch on 12/off 12.     Idiopathic progressive neuropathy  -Continue gabapentin 600 mg 3 times daily.    Type 2 diabetes mellitus with hyperosmolarity without coma, with long-term current use of insulin (H)  -Consistent carb diet.  -Accu-Cheks 4 times daily.  -Lantus to 44 units nightly.  -Continue sliding scale with meals 3 times daily.  -Currently off Ozempic, hold the glimepiride and metformin.   -Monitor BS and make adjustments as necessary.     Sleep apnea, unspecified type  -Continue CPAP.    Benign essential hypertension  -Currently his home chlorthalidone and Lasix are being held. (Home dose of Lasix was 40 mg in the a.m.).   -Metoprolol 100 mg twice daily.  -Benazepril 40 mg daily  -Monitor BP    Personal history of DVT (deep vein thrombosis)  -IVC filter placed on 9//24.  -Continue warfarin.  -INR 2.3.  INR clinic managing Coumadin.  -Per IR filter can be retrievable or permanent.  Set up filter removal when it is no longer needed through IR.    Benign prostatic hyperplasia without lower urinary tract symptoms  -Flomax 0.4 mg nightly.    Depression with anxiety  -Continue prior to admit buspirone 15 mg twice daily.  -Continue prior to admit amitriptyline 25 mg nightly.  -Continue prior to admit duloxetine 60 mg nightly.    Acute on chronic anemia  Thrombocytopenia  -Hemoglobin 10.1.  -Platelets slowly trending down from baseline of 219.  Last platelets 148.  -Mild AST elevation but improving most likely to poor perfusion.    QT prolongation  -On multiple sedating and QT prolonging meds at baseline including amitriptyline, buprenorphine as well as pantoprazole and furosemide.  -Lasix and chlorthalidone continue to be on hold.  -EKG showed possible new right bundle branch block with ST depression and some chest leads  on 9/6.  No past TTE in system.  Patient underwent TTE, CT CA and echo which were reassuring.  -Last QTc 510.     Slow transit constipation  -Continue senna twice daily.    History of tobacco abuse  -Smoked 2 packs/day.  -Continue nicotine patch.      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     DVT (deep venous thrombosis) (H) 09/08/2024     Dyslipidemia 11/17/2018     Essential hypertension 11/17/2018     Hypertension 09/13/2024     CORIE (generalized anxiety disorder) 09/08/2024     Gastric ulcer 03/14/2019     Gout 09/08/2024     Heartburn 09/13/2024     Sexually transmitted infection 09/13/2024     Post-operative pain 09/08/2024     Peripheral neuropathy 03/14/2019     Migraines 03/14/2019     Major depression 09/08/2024     Hyponatremia 09/08/2024     Hypercholesterolemia 03/14/2019     Sleep apnea 03/14/2019     Myelopathy concurrent with and due to stenosis of lumbar spine (H) 09/13/2024     Spinal stenosis of lumbosacral region 09/06/2024     DM2 (diabetes mellitus, type 2) (H) 09/08/2024     Type 2 diabetes mellitus with complication, without long-term current use of insulin (H) 11/17/2018     Erectile dysfunction 09/13/2024     Deep vein thrombosis (DVT) (H) 09/13/2024     Resolved Ambulatory Problems     Diagnosis Date Noted     No Resolved Ambulatory Problems     No Additional Past Medical History     Allergies   Allergen Reactions     Penicillins Hives and Swelling     Empagliflozin Other (See Comments)     Hypotension       All Meds and Allergies reviewed in the record at the facility and is the most up-to-date.    Current Outpatient Medications   Medication Sig Dispense Refill     acetaminophen (TYLENOL) 500 MG tablet Take 1,000 mg by mouth 4 times daily.       albuterol (PROAIR HFA/PROVENTIL HFA/VENTOLIN HFA) 108 (90 Base) MCG/ACT inhaler Inhale 2 puffs into the lungs 4 times daily as needed  for shortness of breath, wheezing or cough.       allopurinol (ZYLOPRIM) 300 MG tablet Take 300 mg by mouth daily.       amitriptyline (ELAVIL) 25 MG tablet Take 25 mg by mouth at bedtime.       aspirin (ASA) 325 MG EC tablet Take 325 mg by mouth daily.       atorvastatin (LIPITOR) 80 MG tablet Take 80 mg by mouth at bedtime.       benazepril (LOTENSIN) 40 MG tablet Take 40 mg by mouth every morning.       buprenorphine (SUBUTEX) 2 MG SUBL sublingual tablet Place 1 tablet (2 mg) under the tongue daily. 14 tablet 0     busPIRone (BUSPAR) 15 MG tablet Take 15 mg by mouth 2 times daily.       camphor-menthol-methyl sal 4-10-30 % CREA Apply topically 3 times daily.       celecoxib (CELEBREX) 200 MG capsule Take 200 mg by mouth 2 times daily.       diazepam (VALIUM) 5 MG tablet Take 1 tablet (5 mg) by mouth 2 times daily as needed for anxiety. 30 tablet 0     DULoxetine (CYMBALTA) 60 MG capsule Take 60 mg by mouth at bedtime.       fenofibrate (TRICOR) 145 MG tablet Take 145 mg by mouth daily.       gabapentin (NEURONTIN) 300 MG capsule Take 600 mg by mouth 3 times daily.       guaiFENesin (MUCINEX) 600 MG 12 hr tablet Take 1,200 mg by mouth 2 times daily.       insulin aspart (NOVOLOG PEN) 100 UNIT/ML pen Inject subcutaneously 3 times daily (with meals). Per Sliding Scale;  If Blood Sugar is less than 70, call MD.  If Blood Sugar is 150 to 199, give 2 Units.  If Blood Sugar is 200 to 249, give 4 Units.  If Blood Sugar is 250 to 299, give 6 Units.  If Blood Sugar is 300 to 349, give 8 Units.  If Blood Sugar is 350 to 399, give 10 Units.  If Blood Sugar is 400 to 499, give 12 Units.       insulin glargine (LANTUS PEN) 100 UNIT/ML pen Inject 40 Units subcutaneously at bedtime.       levothyroxine (SYNTHROID/LEVOTHROID) 75 MCG tablet Take 75 mcg by mouth every morning.       methocarbamol (ROBAXIN) 750 MG tablet Take 750 mg by mouth 4 times daily.       metoprolol tartrate (LOPRESSOR) 100 MG tablet Take 100 mg by mouth 2  "times daily.       morphine (MS CONTIN) 30 MG CR tablet Take 1 tablet (30 mg) by mouth every 12 hours. 30 tablet 0     morphine (MSIR) 15 MG IR tablet Take 1 tablet (15 mg) by mouth 4 times daily as needed for severe pain. 30 tablet 0     naloxone (NARCAN) 4 MG/0.1ML nasal spray Spray 4 mg into one nostril alternating nostrils as needed for opioid reversal. every 2-3 minutes until assistance arrives       nicotine (NICODERM CQ) 21 MG/24HR 24 hr patch Place 1 patch onto the skin every 24 hours.       omeprazole (PRILOSEC) 20 MG DR capsule Take 20 mg by mouth daily.       polyethylene glycol (MIRALAX) 17 g packet Take 17 g by mouth daily as needed for constipation.       senna (SENOKOT) 8.6 MG tablet Take 1 tablet by mouth daily as needed for constipation.       senna-docusate (SENOKOT-S/PERICOLACE) 8.6-50 MG tablet Take 2 tablets by mouth 2 times daily.       tamsulosin (FLOMAX) 0.4 MG capsule Take 0.4 mg by mouth daily.       No current facility-administered medications for this visit.       REVIEW OF SYSTEMS:   10 point review of systems reviewed and pertinent positives in the HPI.     PHYSICAL EXAMINATION:  Physical Exam     Vital signs: /67   Pulse 71   Temp 97.3  F (36.3  C)   Resp 18   Ht 1.994 m (6' 6.5\")   Wt 147.4 kg (325 lb)   SpO2 94%   BMI 37.08 kg/m    General: Awake, Alert, oriented x3, lying in bed,conversant  HEENT: Pink conjunctiva, moist oral mucosa  NECK: Supple  CVS:  S1  S2, without murmur or gallop.   LUNG: Clear to auscultation, No wheezes, rales or rhonci.  BACK: Dressing intact to incision from the lumbar spine to top of thoracic spine. Tenderness at the left SI joint with palpitation.   ABDOMEN: Soft, obese, with positive bowel sounds.  Abdominal incision intact with Steri-Strips.  No drainage.  EXTREMITIES: Good range of motion on both upper and lower extremities, no pedal edema, no calf tenderness  NEUROLOGIC: Intact, pulses palpable  PSYCHIATRIC: Cognition intact      Labs:  " All labs reviewed in the nursing home record and Epic   @  Lab Results   Component Value Date    WBC 7.9 09/13/2024     Lab Results   Component Value Date    RBC 3.48 09/13/2024     Lab Results   Component Value Date    HGB 10.1 09/13/2024     Lab Results   Component Value Date    HCT 30.6 09/13/2024     Lab Results   Component Value Date    MCV 88 09/13/2024     Lab Results   Component Value Date    MCH 29.0 09/13/2024     Lab Results   Component Value Date    MCHC 33.0 09/13/2024     Lab Results   Component Value Date    RDW 16.7 09/13/2024     Lab Results   Component Value Date     09/13/2024        @Last Comprehensive Metabolic Panel:  Sodium   Date Value Ref Range Status   09/13/2024 136 135 - 145 mmol/L Final     Potassium   Date Value Ref Range Status   09/13/2024 4.0 3.4 - 5.3 mmol/L Final     Chloride   Date Value Ref Range Status   09/13/2024 101 98 - 107 mmol/L Final     Carbon Dioxide (CO2)   Date Value Ref Range Status   09/13/2024 26 22 - 29 mmol/L Final     Anion Gap   Date Value Ref Range Status   09/13/2024 9 7 - 15 mmol/L Final     Glucose   Date Value Ref Range Status   09/13/2024 184 (H) 70 - 99 mg/dL Final     Urea Nitrogen   Date Value Ref Range Status   09/13/2024 11.8 6.0 - 20.0 mg/dL Final     Creatinine   Date Value Ref Range Status   09/13/2024 0.62 (L) 0.67 - 1.17 mg/dL Final     GFR Estimate   Date Value Ref Range Status   09/13/2024 >90 >60 mL/min/1.73m2 Final     Comment:     eGFR calculated using 2021 CKD-EPI equation.     Calcium   Date Value Ref Range Status   09/13/2024 8.7 (L) 8.8 - 10.4 mg/dL Final     Comment:     Reference intervals for this test were updated on 7/16/2024 to reflect our healthy population more accurately. There may be differences in the flagging of prior results with similar values performed with this method. Those prior results can be interpreted in the context of the updated reference intervals.       This note has been dictated using voice recognition  software. Any grammatical or context distortions are unintentional and inherent to the software    Electronically signed by: Liliane Pratt CNP       Sincerely,        Liliane Pratt, NP

## 2024-09-25 NOTE — PROGRESS NOTES
[facility] GERIATRIC SERVICES    Code Status:  FULL CODE   Visit Type:   Chief Complaint   Patient presents with    TCU Follow Up     Facility:  [facility] ([facility]) [[id number]]         HPI: Luiz Matthews is a 49 year old male who I am seeing today for follow up on  the TCU. Past medical history includes chronic back pain with radiculopathy, chronic opioid use, history of DVT on warfarin, hypertension, dyslipidemia, GERD with history of gastric ulcer, obesity, obstructive sleep apnea, depression with anxiety, BPH, hypothyroidism, gout, tobacco abuse, EtOH history in remission, thrombocytopenia and type 2 diabetes on insulin.  Patient with a history of spinal stenosis of the lumbar sacral region.  He is underwent surgery with a failed decompression in the past.  Patient underwent anterior spinal fusion of L1-S1 and posterior spinal fusion with instrumentation of T4-S1.  He had a large blood loss volume of 4050 cc requiring 1 unit of fresh frozen plasma.  History of chronic pain syndrome.  He was on oxycodone, methocarbamol, diazepam, amitriptyline and hydroxyzine at home.  He was seen by the pain team several times during his hospitalization and ultimately was placed on long-acting and short acting morphine.  Also on buprenorphine. He continues on methocarbamol and diazepam.  He was followed outpatient by pain clinic.  TLSO splinting out of bed.  History of major depression with anxiety.  Continues on buspirone, amitriptyline and duloxetine.  He is not on his hydroxyzine due to prolonged QTc during hospitalization.  Obstructive sleep apnea on CPAP.  BPH on Flomax.  Hypertension.  Metoprolol increased during hospitalization.  His Lasix and chlorthalidone continue to be on hold due to poor oral intake.  He is also on benazepril.  History of DVT back in his 20s while being a .  He had an IVC filter placed on 9//24 preventatively before surgery.  Continues on warfarin.    Transitional Care Course: Today  patient lying in bed. Pt s/p posterior and interior spinal fusion with instrumentation. Incisions to posterior and anterior spine intact. No redness or warmth. Pt continues to report pain from right SI joint radiating to right hip and groin. He feels tenderness or SI joint with palpitation. He continues on MS contin 30 mg BID and Morphine IR 15 mg QID. He is also receiving gabapentin 600 mg TID, diazepam BID prn, buprenorphine and amitriptyline. He has hx of chronic pain syndrome and is followed out pt by pain clinic in [place]. Today he is upset on exam feels he is not improving. We were able to set up an appt with his surgeon for Thursday. He has received E stim in therapy however feel it was not helpful. He has tried his massager and is was not helpful. He is ambulating with rolling walker. He denies any numbness or tingling. He is moving all extremities. He also tried muscle rub with no improvement. Today I offered going back to hospital for pain management and he declines. He would like to go home. He misses his kids and wife.  is working on transfer. However he also reports he does not want to go to soon and have problems. He is having regular bowel movements. He denies any SOB or Cp. DM2. Continues on Lantus and Ozempic.       Assessment/Plan:     Chronic low back pain with sciatica, sciatica laterality unspecified, unspecified back pain laterality  S/P lumbar spinal fusion  Chronic pain syndrome  -Status post anterior spinal interbody fusion L1-S1; status post posterior spine fusion with instrumentation T4-S1.  -Encourage out of bed activity.  -TLSO OOB.   -Continue methocarbamol 750 mg every 6 hours  -Continue MS Contin 30 mg every 12 hours. (Attempt to wean)   -Continue morphine immediate release 15 mg QID prn.   Every 4 hours pain assessment.  -Diazepam 5 mg twice daily as needed.  -Celebrex 200 mg 3 times daily ending on 9/19/2024. Reinstate Celebrex.   -Continue buprenorphine 2 mg  daily.  -Muscle rub TID.   -PT to eval for pain modalities.   -Follow up with Neurosurgeon on Thursday.   -Lidocaine patch on 12/off 12.     Idiopathic progressive neuropathy  -Continue gabapentin 600 mg 3 times daily.    Type 2 diabetes mellitus with hyperosmolarity without coma, with long-term current use of insulin (H)  -Consistent carb diet.  -Accu-Cheks 4 times daily.  -Lantus to 44 units nightly.  -Continue sliding scale with meals 3 times daily.  -Currently off Ozempic, hold the glimepiride and metformin.   -Monitor BS and make adjustments as necessary.     Sleep apnea, unspecified type  -Continue CPAP.    Benign essential hypertension  -Currently his home chlorthalidone and Lasix are being held. (Home dose of Lasix was 40 mg in the a.m.).   -Metoprolol 100 mg twice daily.  -Benazepril 40 mg daily  -Monitor BP    Personal history of DVT (deep vein thrombosis)  -IVC filter placed on 9//24.  -Continue warfarin.  -INR 2.3.  INR clinic managing Coumadin.  -Per IR filter can be retrievable or permanent.  Set up filter removal when it is no longer needed through IR.    Benign prostatic hyperplasia without lower urinary tract symptoms  -Flomax 0.4 mg nightly.    Depression with anxiety  -Continue prior to admit buspirone 15 mg twice daily.  -Continue prior to admit amitriptyline 25 mg nightly.  -Continue prior to admit duloxetine 60 mg nightly.    Acute on chronic anemia  Thrombocytopenia  -Hemoglobin 10.1.  -Platelets slowly trending down from baseline of 219.  Last platelets 148.  -Mild AST elevation but improving most likely to poor perfusion.    QT prolongation  -On multiple sedating and QT prolonging meds at baseline including amitriptyline, buprenorphine as well as pantoprazole and furosemide.  -Lasix and chlorthalidone continue to be on hold.  -EKG showed possible new right bundle branch block with ST depression and some chest leads on 9/6.  No past TTE in system.  Patient underwent TTE, CT CA and echo which  were reassuring.  -Last QTc 510.     Slow transit constipation  -Continue senna twice daily.    History of tobacco abuse  -Smoked 2 packs/day.  -Continue nicotine patch.      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    DVT (deep venous thrombosis) (H) 09/08/2024    Dyslipidemia 11/17/2018    Essential hypertension 11/17/2018    Hypertension 09/13/2024    CORIE (generalized anxiety disorder) 09/08/2024    Gastric ulcer 03/14/2019    Gout 09/08/2024    Heartburn 09/13/2024    Sexually transmitted infection 09/13/2024    Post-operative pain 09/08/2024    Peripheral neuropathy 03/14/2019    Migraines 03/14/2019    Major depression 09/08/2024    Hyponatremia 09/08/2024    Hypercholesterolemia 03/14/2019    Sleep apnea 03/14/2019    Myelopathy concurrent with and due to stenosis of lumbar spine (H) 09/13/2024    Spinal stenosis of lumbosacral region 09/06/2024    DM2 (diabetes mellitus, type 2) (H) 09/08/2024    Type 2 diabetes mellitus with complication, without long-term current use of insulin (H) 11/17/2018    Erectile dysfunction 09/13/2024    Deep vein thrombosis (DVT) (H) 09/13/2024     Resolved Ambulatory Problems     Diagnosis Date Noted    No Resolved Ambulatory Problems     No Additional Past Medical History     Allergies   Allergen Reactions    Penicillins Hives and Swelling    Empagliflozin Other (See Comments)     Hypotension       All Meds and Allergies reviewed in the record at the facility and is the most up-to-date.    Current Outpatient Medications   Medication Sig Dispense Refill    acetaminophen (TYLENOL) 500 MG tablet Take 1,000 mg by mouth 4 times daily.      albuterol (PROAIR HFA/PROVENTIL HFA/VENTOLIN HFA) 108 (90 Base) MCG/ACT inhaler Inhale 2 puffs into the lungs 4 times daily as needed for shortness of breath, wheezing or cough.      allopurinol (ZYLOPRIM) 300 MG tablet Take 300 mg by mouth  daily.      amitriptyline (ELAVIL) 25 MG tablet Take 25 mg by mouth at bedtime.      aspirin (ASA) 325 MG EC tablet Take 325 mg by mouth daily.      atorvastatin (LIPITOR) 80 MG tablet Take 80 mg by mouth at bedtime.      benazepril (LOTENSIN) 40 MG tablet Take 40 mg by mouth every morning.      buprenorphine (SUBUTEX) 2 MG SUBL sublingual tablet Place 1 tablet (2 mg) under the tongue daily. 14 tablet 0    busPIRone (BUSPAR) 15 MG tablet Take 15 mg by mouth 2 times daily.      camphor-menthol-methyl sal 4-10-30 % CREA Apply topically 3 times daily.      celecoxib (CELEBREX) 200 MG capsule Take 200 mg by mouth 2 times daily.      diazepam (VALIUM) 5 MG tablet Take 1 tablet (5 mg) by mouth 2 times daily as needed for anxiety. 30 tablet 0    DULoxetine (CYMBALTA) 60 MG capsule Take 60 mg by mouth at bedtime.      fenofibrate (TRICOR) 145 MG tablet Take 145 mg by mouth daily.      gabapentin (NEURONTIN) 300 MG capsule Take 600 mg by mouth 3 times daily.      guaiFENesin (MUCINEX) 600 MG 12 hr tablet Take 1,200 mg by mouth 2 times daily.      insulin aspart (NOVOLOG PEN) 100 UNIT/ML pen Inject subcutaneously 3 times daily (with meals). Per Sliding Scale;  If Blood Sugar is less than 70, call MD.  If Blood Sugar is 150 to 199, give 2 Units.  If Blood Sugar is 200 to 249, give 4 Units.  If Blood Sugar is 250 to 299, give 6 Units.  If Blood Sugar is 300 to 349, give 8 Units.  If Blood Sugar is 350 to 399, give 10 Units.  If Blood Sugar is 400 to 499, give 12 Units.      insulin glargine (LANTUS PEN) 100 UNIT/ML pen Inject 40 Units subcutaneously at bedtime.      levothyroxine (SYNTHROID/LEVOTHROID) 75 MCG tablet Take 75 mcg by mouth every morning.      methocarbamol (ROBAXIN) 750 MG tablet Take 750 mg by mouth 4 times daily.      metoprolol tartrate (LOPRESSOR) 100 MG tablet Take 100 mg by mouth 2 times daily.      morphine (MS CONTIN) 30 MG CR tablet Take 1 tablet (30 mg) by mouth every 12 hours. 30 tablet 0    morphine  "(MSIR) 15 MG IR tablet Take 1 tablet (15 mg) by mouth 4 times daily as needed for severe pain. 30 tablet 0    naloxone (NARCAN) 4 MG/0.1ML nasal spray Spray 4 mg into one nostril alternating nostrils as needed for opioid reversal. every 2-3 minutes until assistance arrives      nicotine (NICODERM CQ) 21 MG/24HR 24 hr patch Place 1 patch onto the skin every 24 hours.      omeprazole (PRILOSEC) 20 MG DR capsule Take 20 mg by mouth daily.      polyethylene glycol (MIRALAX) 17 g packet Take 17 g by mouth daily as needed for constipation.      senna (SENOKOT) 8.6 MG tablet Take 1 tablet by mouth daily as needed for constipation.      senna-docusate (SENOKOT-S/PERICOLACE) 8.6-50 MG tablet Take 2 tablets by mouth 2 times daily.      tamsulosin (FLOMAX) 0.4 MG capsule Take 0.4 mg by mouth daily.       No current facility-administered medications for this visit.       REVIEW OF SYSTEMS:   10 point review of systems reviewed and pertinent positives in the HPI.     PHYSICAL EXAMINATION:  Physical Exam     Vital signs: /67   Pulse 71   Temp 97.3  F (36.3  C)   Resp 18   Ht 1.994 m (6' 6.5\")   Wt 147.4 kg (325 lb)   SpO2 94%   BMI 37.08 kg/m    General: Awake, Alert, oriented x3, lying in bed,conversant  HEENT: Pink conjunctiva, moist oral mucosa  NECK: Supple  CVS:  S1  S2, without murmur or gallop.   LUNG: Clear to auscultation, No wheezes, rales or rhonci.  BACK: Dressing intact to incision from the lumbar spine to top of thoracic spine. Tenderness at the left SI joint with palpitation.   ABDOMEN: Soft, obese, with positive bowel sounds.  Abdominal incision intact with Steri-Strips.  No drainage.  EXTREMITIES: Good range of motion on both upper and lower extremities, no pedal edema, no calf tenderness  NEUROLOGIC: Intact, pulses palpable  PSYCHIATRIC: Cognition intact      Labs:  All labs reviewed in the nursing home record and [facility]   @  Lab Results   Component Value Date    WBC 7.9 09/13/2024     Lab Results "   Component Value Date    RBC 3.48 09/13/2024     Lab Results   Component Value Date    HGB 10.1 09/13/2024     Lab Results   Component Value Date    HCT 30.6 09/13/2024     Lab Results   Component Value Date    MCV 88 09/13/2024     Lab Results   Component Value Date    MCH 29.0 09/13/2024     Lab Results   Component Value Date    MCHC 33.0 09/13/2024     Lab Results   Component Value Date    RDW 16.7 09/13/2024     Lab Results   Component Value Date     09/13/2024        @Last Comprehensive Metabolic Panel:  Sodium   Date Value Ref Range Status   09/13/2024 136 135 - 145 mmol/L Final     Potassium   Date Value Ref Range Status   09/13/2024 4.0 3.4 - 5.3 mmol/L Final     Chloride   Date Value Ref Range Status   09/13/2024 101 98 - 107 mmol/L Final     Carbon Dioxide (CO2)   Date Value Ref Range Status   09/13/2024 26 22 - 29 mmol/L Final     Anion Gap   Date Value Ref Range Status   09/13/2024 9 7 - 15 mmol/L Final     Glucose   Date Value Ref Range Status   09/13/2024 184 (H) 70 - 99 mg/dL Final     Urea Nitrogen   Date Value Ref Range Status   09/13/2024 11.8 6.0 - 20.0 mg/dL Final     Creatinine   Date Value Ref Range Status   09/13/2024 0.62 (L) 0.67 - 1.17 mg/dL Final     GFR Estimate   Date Value Ref Range Status   09/13/2024 >90 >60 mL/min/1.73m2 Final     Comment:     eGFR calculated using 2021 CKD-EPI equation.     Calcium   Date Value Ref Range Status   09/13/2024 8.7 (L) 8.8 - 10.4 mg/dL Final     Comment:     Reference intervals for this test were updated on 7/16/2024 to reflect our healthy population more accurately. There may be differences in the flagging of prior results with similar values performed with this method. Those prior results can be interpreted in the context of the updated reference intervals.       This note has been dictated using voice recognition software. Any grammatical or context distortions are unintentional and inherent to the software    Electronically signed by: Liliane  Santa, CNP

## 2024-09-26 ENCOUNTER — TELEPHONE (OUTPATIENT)
Dept: GERIATRICS | Facility: CLINIC | Age: 49
End: 2024-09-26

## 2024-09-26 DIAGNOSIS — G89.18 ACUTE POST-OPERATIVE PAIN: Primary | ICD-10-CM

## 2024-09-26 DIAGNOSIS — G62.9 PERIPHERAL POLYNEUROPATHY: ICD-10-CM

## 2024-09-26 RX ORDER — PREGABALIN 150 MG/1
150 CAPSULE ORAL 2 TIMES DAILY
Qty: 30 CAPSULE | Refills: 2 | Status: SHIPPED | OUTPATIENT
Start: 2024-09-26

## 2024-09-26 NOTE — TELEPHONE ENCOUNTER
Resident went to see surgeon today and was ordered Medrol Pack and changed from Gabapentin 600mg TID to Lyrica 150mg twice a day.      Did not come back with script and so nursing called the clinic to ask to have one faxed over.  Nurse manager was able to speak with the 's nurse and send a script but as of 430pm or after, no fax arrived.    This NP was asked already when updated about situation if comfortable to send a script through if needed.      Will send [facility] pharmacy a order for Lyrica 150mg po BID for 30 tabs.    Planning on seeing him tomorrow when at facility.    SUSIE Haley CNP

## 2024-09-27 ENCOUNTER — TRANSITIONAL CARE UNIT VISIT (OUTPATIENT)
Dept: GERIATRICS | Facility: CLINIC | Age: 49
End: 2024-09-27

## 2024-09-27 VITALS
OXYGEN SATURATION: 98 % | WEIGHT: 315 LBS | BODY MASS INDEX: 37.08 KG/M2 | RESPIRATION RATE: 18 BRPM | DIASTOLIC BLOOD PRESSURE: 73 MMHG | SYSTOLIC BLOOD PRESSURE: 114 MMHG | TEMPERATURE: 97.3 F | HEART RATE: 68 BPM

## 2024-09-27 DIAGNOSIS — Z98.1 S/P LUMBAR SPINAL FUSION: ICD-10-CM

## 2024-09-27 DIAGNOSIS — G62.9 PERIPHERAL POLYNEUROPATHY: ICD-10-CM

## 2024-09-27 DIAGNOSIS — G89.18 ACUTE POST-OPERATIVE PAIN: ICD-10-CM

## 2024-09-27 DIAGNOSIS — M48.07 SPINAL STENOSIS OF LUMBOSACRAL REGION: Primary | ICD-10-CM

## 2024-09-27 DIAGNOSIS — E11.49 TYPE 2 DIABETES MELLITUS WITH OTHER NEUROLOGIC COMPLICATION, WITH LONG-TERM CURRENT USE OF INSULIN (H): ICD-10-CM

## 2024-09-27 DIAGNOSIS — Z79.4 TYPE 2 DIABETES MELLITUS WITH OTHER NEUROLOGIC COMPLICATION, WITH LONG-TERM CURRENT USE OF INSULIN (H): ICD-10-CM

## 2024-09-27 DIAGNOSIS — G89.4 CHRONIC PAIN SYNDROME: ICD-10-CM

## 2024-09-27 DIAGNOSIS — F41.8 DEPRESSION WITH ANXIETY: ICD-10-CM

## 2024-09-27 PROCEDURE — 99309 SBSQ NF CARE MODERATE MDM 30: CPT | Performed by: NURSE PRACTITIONER

## 2024-09-27 NOTE — LETTER
9/27/2024      Luiz Matthews  [address] [address]        [facility] GERIATRICS  ACUTE/EPISODIC VISIT    [facility] Medical Record Number:  [id number]  Place of Service where encounter took place:  [facility] ([facility]) [[id number]]    Chief Complaint   Patient presents with     RECHECK       HPI:    Luzi Matthews is a 49 year old  (1975), who is being seen today for an episodic care visit.  HPI information obtained from: facility chart records, facility staff, patient report, [facility] chart review, and Care Everywhere [facility] chart review.    Today's concern is:    Diagnoses         Codes Comments    Spinal stenosis of lumbosacral region    -  Primary M48.07     S/P lumbar spinal fusion     Z98.1     Acute post-operative pain     G89.18     Peripheral polyneuropathy     G62.9     Chronic pain syndrome     G89.4     Depression with anxiety     F41.8     Type 2 diabetes mellitus with other neurologic complication, with long-term current use of insulin (H)     E11.49, Z79.4           Was asked to see Luiz either yesterday or today for follow up on how rehab process was going.      Yesterday Luiz went to see the surgeon in the morning and he was battling so much pain that the process to load him into the ride was difficult as well as unloading when he came back.      Nurse manager updated this NP yesterday that Luiz was started on Medrol pack to decrease inflammation and the removed from Gabapentin and started on Lyrica 150mg BID.  Staff worked with clinic to get a script sent over for the Lyrica but never received.  This NP sent a script over instead.      Before going into Luiz's room, did ask how his day was going.  Nursing said that he is quiet today.      Found Luiz in his room with one of his children staying with him in order to help out with little things Luiz is unable to do.  Watching a movie together.    Luiz is from [place] and had surgery up at [facility].   He is anxious to get back home to his family.  Nursing states that the surgeon wants Luiz to get a repeat CT scan before going home.  Nursing is trying to get that set up for next week.      Luiz is alert and oriented x3.  Pleasant with this NP.  Talked about an aray of things that feel helped keep his thoughts off his pain.  Luiz was able to tell this NP what the surgeon wanted to do with the medication change.  He was also tell this NP other pain medication he is on as he has chronic pain too.    Next appointment made today from staff for 10/2/24 for follow up.      ALLERGIES:    Allergies   Allergen Reactions     Penicillins Hives and Swelling     Empagliflozin Other (See Comments)     Hypotension        MEDICATIONS:  Post Discharge Medication Reconciliation Status: patient was not discharged from an inpatient facility or TCU. Medications reconciled today    Current Outpatient Medications   Medication Sig Dispense Refill     insulin glargine (LANTUS PEN) 100 UNIT/ML pen Inject 44 Units subcutaneously at bedtime.       acetaminophen (TYLENOL) 500 MG tablet Take 1,000 mg by mouth 4 times daily.       albuterol (PROAIR HFA/PROVENTIL HFA/VENTOLIN HFA) 108 (90 Base) MCG/ACT inhaler Inhale 2 puffs into the lungs 4 times daily as needed for shortness of breath, wheezing or cough.       allopurinol (ZYLOPRIM) 300 MG tablet Take 300 mg by mouth daily.       amitriptyline (ELAVIL) 25 MG tablet Take 25 mg by mouth at bedtime.       aspirin (ASA) 325 MG EC tablet Take 325 mg by mouth daily.       atorvastatin (LIPITOR) 80 MG tablet Take 80 mg by mouth at bedtime.       benazepril (LOTENSIN) 40 MG tablet Take 40 mg by mouth every morning.       buprenorphine (SUBUTEX) 2 MG SUBL sublingual tablet Place 1 tablet (2 mg) under the tongue daily. 14 tablet 0     busPIRone (BUSPAR) 15 MG tablet Take 15 mg by mouth 2 times daily.       camphor-menthol-methyl sal 4-10-30 % CREA Apply topically 3 times daily.       celecoxib (CELEBREX)  200 MG capsule Take 200 mg by mouth 2 times daily.       diazepam (VALIUM) 5 MG tablet Take 1 tablet (5 mg) by mouth 2 times daily as needed for anxiety. 30 tablet 0     DULoxetine (CYMBALTA) 60 MG capsule Take 60 mg by mouth at bedtime.       fenofibrate (TRICOR) 145 MG tablet Take 145 mg by mouth daily.       guaiFENesin (MUCINEX) 600 MG 12 hr tablet Take 1,200 mg by mouth 2 times daily.       insulin aspart (NOVOLOG PEN) 100 UNIT/ML pen Inject subcutaneously 3 times daily (with meals). Per Sliding Scale;  If Blood Sugar is less than 70, call MD.  If Blood Sugar is 150 to 199, give 2 Units.  If Blood Sugar is 200 to 249, give 4 Units.  If Blood Sugar is 250 to 299, give 6 Units.  If Blood Sugar is 300 to 349, give 8 Units.  If Blood Sugar is 350 to 399, give 10 Units.  If Blood Sugar is 400 to 499, give 12 Units.       levothyroxine (SYNTHROID/LEVOTHROID) 75 MCG tablet Take 75 mcg by mouth every morning.       methocarbamol (ROBAXIN) 750 MG tablet Take 750 mg by mouth 4 times daily.       metoprolol tartrate (LOPRESSOR) 100 MG tablet Take 100 mg by mouth 2 times daily.       morphine (MS CONTIN) 30 MG CR tablet Take 1 tablet (30 mg) by mouth every 12 hours. 30 tablet 0     morphine (MSIR) 15 MG IR tablet Take 1 tablet (15 mg) by mouth 4 times daily as needed for severe pain. 30 tablet 0     naloxone (NARCAN) 4 MG/0.1ML nasal spray Spray 4 mg into one nostril alternating nostrils as needed for opioid reversal. every 2-3 minutes until assistance arrives       nicotine (NICODERM CQ) 21 MG/24HR 24 hr patch Place 1 patch onto the skin every 24 hours.       omeprazole (PRILOSEC) 20 MG DR capsule Take 20 mg by mouth daily.       polyethylene glycol (MIRALAX) 17 g packet Take 17 g by mouth daily as needed for constipation.       pregabalin (LYRICA) 150 MG capsule Take 1 capsule (150 mg) by mouth 2 times daily. 30 capsule 2     senna (SENOKOT) 8.6 MG tablet Take 1 tablet by mouth daily as needed for constipation.        senna-docusate (SENOKOT-S/PERICOLACE) 8.6-50 MG tablet Take 2 tablets by mouth 2 times daily.       tamsulosin (FLOMAX) 0.4 MG capsule Take 0.4 mg by mouth daily.           REVIEW OF SYSTEMS:  4 point ROS neg other than the symptoms noted above in the HPI.  No chest pain, no SOB as he is laying flat.      PHYSICAL EXAM:  /73   Pulse 68   Temp 97.3  F (36.3  C)   Resp 18   Wt 147.4 kg (325 lb)   SpO2 98%   BMI 37.08 kg/m    Tall gentleman laying in bed that is elevated.  He  is moving his legs around for positioning through out the visit.    Good ROM in neck.  Abdomen is large, bowel sounds present, incision the length of his abdomen is closed, no redness,  Lungs are clear.  No oxygen.  Heart rate regular and strong.    Modified walker - 4WW with seat in his room.  Modified for his height.  Therapies trying him with a wheelchair.      ASSESSMENT / PLAN:  (M48.07) Spinal stenosis of lumbosacral region  (primary encounter diagnosis)  (Z98.1) S/P lumbar spinal fusion  (G89.18) Acute post-operative pain  Comment: has started the Medrol dose pack to help with inflammation.  [place] that if this is not effective that surgeon may consider prednisone at a longer taper.    Staff setting up a CT scan per direction of the surgeon.    Remains on lidocaine patch to right buttock/thigh, methocarbamol 750mg 4x a day and morphine immediate release 15mg 4x a day prn.    (G62.9) Peripheral polyneuropathy  (G89.4) Chronic pain syndrome  Comment: gabapentin is now discontinued and now Lyrica 150mg po BID.  Just started so hard to .  Do feel though that he has time to think a lot and question if that lets him dwell on his pain so it could seem worse.    Luiz is on multiple multiple pain medications.  No changes.    (F41.8) Depression with anxiety  Comment: currently on Cymbalta 60mg daily. No changes.  Expect his emotions to be up and down due to pain, anxiety, and desire to get home.  Today just spoke about non-specific  health issues in addition to visit.    (E11.49,  Z79.4) Type 2 diabetes mellitus with other neurologic complication, with long-term current use of insulin (H)  Comment: just updating the order for his Lantus to be 44 units in epic.  Uses the Dexcom 7 meter and so spoke about that today.    Plan: insulin glargine (LANTUS PEN) 100 UNIT/ML pen     Orders:  No new orders today.      Electronically signed by  SUSIE Haley CNP           Sincerely,        SUSIE Haley CNP

## 2024-09-27 NOTE — PROGRESS NOTES
[facility] GERIATRICS  ACUTE/EPISODIC VISIT    [facility] Medical Record Number:  [id number]  Place of Service where encounter took place:  [facility] ([facility]) [[id number]]    Chief Complaint   Patient presents with    RECHECK       HPI:    Luiz Matthews is a 49 year old  (1975), who is being seen today for an episodic care visit.  HPI information obtained from: facility chart records, facility staff, patient report, [facility] chart review, and Care Everywhere [facility] chart review.    Today's concern is:    Diagnoses         Codes Comments    Spinal stenosis of lumbosacral region    -  Primary M48.07     S/P lumbar spinal fusion     Z98.1     Acute post-operative pain     G89.18     Peripheral polyneuropathy     G62.9     Chronic pain syndrome     G89.4     Depression with anxiety     F41.8     Type 2 diabetes mellitus with other neurologic complication, with long-term current use of insulin (H)     E11.49, Z79.4           Was asked to see Luiz either yesterday or today for follow up on how rehab process was going.      Yesterday Luiz went to see the surgeon in the morning and he was battling so much pain that the process to load him into the ride was difficult as well as unloading when he came back.      Nurse manager updated this NP yesterday that Luiz was started on Medrol pack to decrease inflammation and the removed from Gabapentin and started on Lyrica 150mg BID.  Staff worked with clinic to get a script sent over for the Lyrica but never received.  This NP sent a script over instead.      Before going into Luiz's room, did ask how his day was going.  Nursing said that he is quiet today.      Found Luiz in his room with one of his children staying with him in order to help out with little things Luiz is unable to do.  Watching a movie together.    Luiz is from [place] and had surgery up at [facility].  He is anxious to get back home to his family.  Nursing states that the  surgeon wants Luiz to get a repeat CT scan before going home.  Nursing is trying to get that set up for next week.      Luiz is alert and oriented x3.  Pleasant with this NP.  Talked about an aray of things that feel helped keep his thoughts off his pain.  Luiz was able to tell this NP what the surgeon wanted to do with the medication change.  He was also tell this NP other pain medication he is on as he has chronic pain too.    Next appointment made today from staff for 10/2/24 for follow up.      ALLERGIES:    Allergies   Allergen Reactions    Penicillins Hives and Swelling    Empagliflozin Other (See Comments)     Hypotension        MEDICATIONS:  Post Discharge Medication Reconciliation Status: patient was not discharged from an inpatient facility or TCU. Medications reconciled today    Current Outpatient Medications   Medication Sig Dispense Refill    insulin glargine (LANTUS PEN) 100 UNIT/ML pen Inject 44 Units subcutaneously at bedtime.      acetaminophen (TYLENOL) 500 MG tablet Take 1,000 mg by mouth 4 times daily.      albuterol (PROAIR HFA/PROVENTIL HFA/VENTOLIN HFA) 108 (90 Base) MCG/ACT inhaler Inhale 2 puffs into the lungs 4 times daily as needed for shortness of breath, wheezing or cough.      allopurinol (ZYLOPRIM) 300 MG tablet Take 300 mg by mouth daily.      amitriptyline (ELAVIL) 25 MG tablet Take 25 mg by mouth at bedtime.      aspirin (ASA) 325 MG EC tablet Take 325 mg by mouth daily.      atorvastatin (LIPITOR) 80 MG tablet Take 80 mg by mouth at bedtime.      benazepril (LOTENSIN) 40 MG tablet Take 40 mg by mouth every morning.      buprenorphine (SUBUTEX) 2 MG SUBL sublingual tablet Place 1 tablet (2 mg) under the tongue daily. 14 tablet 0    busPIRone (BUSPAR) 15 MG tablet Take 15 mg by mouth 2 times daily.      camphor-menthol-methyl sal 4-10-30 % CREA Apply topically 3 times daily.      celecoxib (CELEBREX) 200 MG capsule Take 200 mg by mouth 2 times daily.      diazepam (VALIUM) 5 MG  tablet Take 1 tablet (5 mg) by mouth 2 times daily as needed for anxiety. 30 tablet 0    DULoxetine (CYMBALTA) 60 MG capsule Take 60 mg by mouth at bedtime.      fenofibrate (TRICOR) 145 MG tablet Take 145 mg by mouth daily.      guaiFENesin (MUCINEX) 600 MG 12 hr tablet Take 1,200 mg by mouth 2 times daily.      insulin aspart (NOVOLOG PEN) 100 UNIT/ML pen Inject subcutaneously 3 times daily (with meals). Per Sliding Scale;  If Blood Sugar is less than 70, call MD.  If Blood Sugar is 150 to 199, give 2 Units.  If Blood Sugar is 200 to 249, give 4 Units.  If Blood Sugar is 250 to 299, give 6 Units.  If Blood Sugar is 300 to 349, give 8 Units.  If Blood Sugar is 350 to 399, give 10 Units.  If Blood Sugar is 400 to 499, give 12 Units.      levothyroxine (SYNTHROID/LEVOTHROID) 75 MCG tablet Take 75 mcg by mouth every morning.      methocarbamol (ROBAXIN) 750 MG tablet Take 750 mg by mouth 4 times daily.      metoprolol tartrate (LOPRESSOR) 100 MG tablet Take 100 mg by mouth 2 times daily.      morphine (MS CONTIN) 30 MG CR tablet Take 1 tablet (30 mg) by mouth every 12 hours. 30 tablet 0    morphine (MSIR) 15 MG IR tablet Take 1 tablet (15 mg) by mouth 4 times daily as needed for severe pain. 30 tablet 0    naloxone (NARCAN) 4 MG/0.1ML nasal spray Spray 4 mg into one nostril alternating nostrils as needed for opioid reversal. every 2-3 minutes until assistance arrives      nicotine (NICODERM CQ) 21 MG/24HR 24 hr patch Place 1 patch onto the skin every 24 hours.      omeprazole (PRILOSEC) 20 MG DR capsule Take 20 mg by mouth daily.      polyethylene glycol (MIRALAX) 17 g packet Take 17 g by mouth daily as needed for constipation.      pregabalin (LYRICA) 150 MG capsule Take 1 capsule (150 mg) by mouth 2 times daily. 30 capsule 2    senna (SENOKOT) 8.6 MG tablet Take 1 tablet by mouth daily as needed for constipation.      senna-docusate (SENOKOT-S/PERICOLACE) 8.6-50 MG tablet Take 2 tablets by mouth 2 times daily.       tamsulosin (FLOMAX) 0.4 MG capsule Take 0.4 mg by mouth daily.           REVIEW OF SYSTEMS:  4 point ROS neg other than the symptoms noted above in the HPI.  No chest pain, no SOB as he is laying flat.      PHYSICAL EXAM:  /73   Pulse 68   Temp 97.3  F (36.3  C)   Resp 18   Wt 147.4 kg (325 lb)   SpO2 98%   BMI 37.08 kg/m    Tall gentleman laying in bed that is elevated.  He  is moving his legs around for positioning through out the visit.    Good ROM in neck.  Abdomen is large, bowel sounds present, incision the length of his abdomen is closed, no redness,  Lungs are clear.  No oxygen.  Heart rate regular and strong.    Modified walker - 4WW with seat in his room.  Modified for his height.  Therapies trying him with a wheelchair.      ASSESSMENT / PLAN:  (M48.07) Spinal stenosis of lumbosacral region  (primary encounter diagnosis)  (Z98.1) S/P lumbar spinal fusion  (G89.18) Acute post-operative pain  Comment: has started the Medrol dose pack to help with inflammation.  Wagoner that if this is not effective that surgeon may consider prednisone at a longer taper.    Staff setting up a CT scan per direction of the surgeon.    Remains on lidocaine patch to right buttock/thigh, methocarbamol 750mg 4x a day and morphine immediate release 15mg 4x a day prn.    (G62.9) Peripheral polyneuropathy  (G89.4) Chronic pain syndrome  Comment: gabapentin is now discontinued and now Lyrica 150mg po BID.  Just started so hard to .  Do feel though that he has time to think a lot and question if that lets him dwell on his pain so it could seem worse.    Luiz is on multiple multiple pain medications.  No changes.    (F41.8) Depression with anxiety  Comment: currently on Cymbalta 60mg daily. No changes.  Expect his emotions to be up and down due to pain, anxiety, and desire to get home.  Today just spoke about non-specific health issues in addition to visit.    (E11.49,  Z79.4) Type 2 diabetes mellitus with other  neurologic complication, with long-term current use of insulin (H)  Comment: just updating the order for his Lantus to be 44 units in epic.  Uses the Dexcom 7 meter and so spoke about that today.    Plan: insulin glargine (LANTUS PEN) 100 UNIT/ML pen     Orders:  No new orders today.      Electronically signed by  SUSIE Haley CNP

## 2024-09-30 ENCOUNTER — ANTICOAGULATION THERAPY VISIT (OUTPATIENT)
Dept: ANTICOAGULATION | Facility: CLINIC | Age: 49
End: 2024-09-30

## 2024-09-30 ENCOUNTER — TRANSITIONAL CARE UNIT VISIT (OUTPATIENT)
Dept: GERIATRICS | Facility: CLINIC | Age: 49
End: 2024-09-30

## 2024-09-30 VITALS
WEIGHT: 313.2 LBS | BODY MASS INDEX: 36.24 KG/M2 | DIASTOLIC BLOOD PRESSURE: 76 MMHG | HEIGHT: 78 IN | SYSTOLIC BLOOD PRESSURE: 130 MMHG | OXYGEN SATURATION: 97 % | RESPIRATION RATE: 18 BRPM | HEART RATE: 68 BPM | TEMPERATURE: 96.3 F

## 2024-09-30 DIAGNOSIS — Z98.1 S/P LUMBAR SPINAL FUSION: ICD-10-CM

## 2024-09-30 DIAGNOSIS — F41.8 DEPRESSION WITH ANXIETY: ICD-10-CM

## 2024-09-30 DIAGNOSIS — G60.3 IDIOPATHIC PROGRESSIVE NEUROPATHY: ICD-10-CM

## 2024-09-30 DIAGNOSIS — M48.07 SPINAL STENOSIS OF LUMBOSACRAL REGION: Primary | ICD-10-CM

## 2024-09-30 DIAGNOSIS — I82.409 DVT (DEEP VENOUS THROMBOSIS) (H): ICD-10-CM

## 2024-09-30 DIAGNOSIS — G89.4 CHRONIC PAIN SYNDROME: ICD-10-CM

## 2024-09-30 DIAGNOSIS — I82.409 DEEP VEIN THROMBOSIS (DVT) (H): Primary | ICD-10-CM

## 2024-09-30 DIAGNOSIS — G62.9 PERIPHERAL POLYNEUROPATHY: ICD-10-CM

## 2024-09-30 DIAGNOSIS — Z79.4 TYPE 2 DIABETES MELLITUS WITH OTHER NEUROLOGIC COMPLICATION, WITH LONG-TERM CURRENT USE OF INSULIN (H): ICD-10-CM

## 2024-09-30 DIAGNOSIS — E11.49 TYPE 2 DIABETES MELLITUS WITH OTHER NEUROLOGIC COMPLICATION, WITH LONG-TERM CURRENT USE OF INSULIN (H): ICD-10-CM

## 2024-09-30 LAB — INR (EXTERNAL): 2.2

## 2024-09-30 PROCEDURE — 99309 SBSQ NF CARE MODERATE MDM 30: CPT | Performed by: NURSE PRACTITIONER

## 2024-09-30 NOTE — PROGRESS NOTES
ANTICOAGULATION MANAGEMENT     Luiz Matthews 49 year old male is on warfarin with therapeutic INR result. (Goal INR 2.0-3.0)    Recent labs: (last 7 days)     09/30/24  0000   INR 2.2       ASSESSMENT     Source(s): Chart Review, Home Care/Facility Nurse, and Template     Warfarin doses taken: Warfarin taken as instructed  Diet: No new diet changes identified  Medication/supplement changes: None noted  New illness, injury, or hospitalization: No  Signs or symptoms of bleeding or clotting: No - some diarrhea - not new  Previous result: Therapeutic last 2(+) visits  Additional findings: None       PLAN     Recommended plan for no diet, medication or health factor changes affecting INR     Dosing Instructions: Continue your current warfarin dose with next INR in 1 week       Summary  As of 9/30/2024      Full warfarin instructions:  3 mg every day   Next INR check:  10/7/2024               Telephone call with [facility] nurse (medical care for seniors) who verbalizes understanding and agrees to plan    Orders given to  Homecare nurse/facility to recheck    Education provided: Please call back if any changes to your diet, medications or how you've been taking warfarin    Plan made per [facility] anticoagulation protocol    Sanjuanita Chahal RN  9/30/2024  Anticoagulation Clinic  Social IQ (Social Influence Quotient) for routing messages: p [facility] (TCU/LTC/RICK)  [facility] patient phone line: [phone]        _______________________________________________________________________     Anticoagulation Episode Summary       Current INR goal:  2.0-3.0   TTR:  100.0% (1 wk)   Target end date:  Indefinite   Send INR reminders to:  [facility] (TCU/LTC/RICK)    Indications    Deep vein thrombosis (DVT) (H) [I82.409]  DVT (deep venous thrombosis) (H) [I82.409]             Comments:  [facility]   [phone]                 Anticoagulation Care Providers       Provider Role Specialty Phone number     Liliane Pratt NP Referring Nurse Practitioner [phone]

## 2024-09-30 NOTE — LETTER
9/30/2024      Luiz Matthews  [address] [facility] [address]        [facility]    Code Status:  FULL CODE   Visit Type:   Chief Complaint   Patient presents with     TCU Follow Up     Facility:  [facility] ([facility]) [[id number]]         HPI: Luiz Matthews is a 49 year old male who I am seeing today for follow up on  the TCU. Past medical history includes chronic back pain with radiculopathy, chronic opioid use, history of DVT on warfarin, hypertension, dyslipidemia, GERD with history of gastric ulcer, obesity, obstructive sleep apnea, depression with anxiety, BPH, hypothyroidism, gout, tobacco abuse, EtOH history in remission, thrombocytopenia and type 2 diabetes on insulin.  Patient with a history of spinal stenosis of the lumbar sacral region.  He is underwent surgery with a failed decompression in the past.  Patient underwent anterior spinal fusion of L1-S1 and posterior spinal fusion with instrumentation of T4-S1.  He had a large blood loss volume of 4050 cc requiring 1 unit of fresh frozen plasma.  History of chronic pain syndrome.  He was on oxycodone, methocarbamol, diazepam, amitriptyline and hydroxyzine at home.  He was seen by the pain team several times during his hospitalization and ultimately was placed on long-acting and short acting morphine.  Also on buprenorphine. He continues on methocarbamol and diazepam.  He was followed outpatient by pain clinic.  TLSO splinting out of bed.  History of major depression with anxiety.  Continues on buspirone, amitriptyline and duloxetine.  He is not on his hydroxyzine due to prolonged QTc during hospitalization.  Obstructive sleep apnea on CPAP.  BPH on Flomax.  Hypertension.  Metoprolol increased during hospitalization.  His Lasix and chlorthalidone continue to be on hold due to poor oral intake.  He is also on benazepril.  History of DVT back in his 20s while being a .  He had an IVC filter placed on 9//24  preventatively before surgery.  Continues on warfarin.    Transitional Care Course: Today patient lying in bed.  His son is present on exam.  Pt s/p posterior and interior spinal fusion with instrumentation. Incisions to posterior and anterior spine intact. No redness or warmth.  Patient seen by surgeon on 9/26/2024.  Plans are to undergo CT with injection on 10/3/2024.  Gabapentin also discontinued and patient started on Lyrica 150 mg twice daily.  He was also placed on Medrol Dosepak.  He continues on MS Contin 30 mg twice daily and morphine IR 15 mg 4 times daily.  He is also taking diazepam twice daily as needed and buprenorphine and amitriptyline.  He has a history of chronic pain syndrome.  Initially thought his pain had improved when the steroids began however feels recent changes are not very helpful.  He continues to report pain in his left SI joint radiating down his thigh and into his groin.  He was receiving e-stim therapy however felt that was not helpful.  He is ambulatory with a rolling walker.  He had been on a nicotine patch.  History of smoking.  However today he was found outside smoking.  Patch was removed.  He reports increased anxiety and depressive symptoms.  He would like to return home to [place].  Dr. Grayson would like for him to remain here until symptoms are worked up.  He did report some diarrhea over the weekend which she thought was linked to food poisoning from eating some Chinese food.  This is now resolved.  Diabetes mellitus type 2 on Lantus and Ozempic.  Patient denies shortness of breath or chest pain.    Assessment/Plan:     Chronic low back pain with sciatica, sciatica laterality unspecified, unspecified back pain laterality  S/P lumbar spinal fusion  Chronic pain syndrome  -Status post anterior spinal interbody fusion L1-S1; status post posterior spine fusion with instrumentation T4-S1.  -Encourage out of bed activity.  -TLSO (may remove when up ambulating or out of bed).    -Continue methocarbamol 750 mg every 6 hours  -Continue MS Contin 30 mg every 12 hours. (Attempt to wean)   -Continue morphine immediate release 15 mg QID prn.   Every 4 hours pain assessment.  -Diazepam 5 mg twice daily as needed.  -Celebrex 200 mg 3 times daily until 9/30/2024.  -Continue buprenorphine 2 mg daily.  -Muscle rub TID.   -PT to eval for pain modalities.   -Follow up with Neurosurgeon on 9/26. Plans to have CT with injection on 10/3/24 at [facility].   -Lidocaine patch on 12/off 12.   -Continues on Medrol Dosepak.    Idiopathic progressive neuropathy  -Gabapentin discontinued and patient started on Lyrica 150 mg twice daily.    Type 2 diabetes mellitus with hyperosmolarity without coma, with long-term current use of insulin (H)  -Consistent carb diet.  -Accu-Cheks 4 times daily.  -Lantus to 44 units nightly.  -Continue sliding scale with meals 3 times daily.  -Currently off Ozempic, hold the glimepiride and metformin.   -Monitor BS and make adjustments as necessary.     Sleep apnea, unspecified type  -Continue CPAP.    Benign essential hypertension  -Currently his home chlorthalidone and Lasix are being held. (Home dose of Lasix was 40 mg in the a.m.).   -Metoprolol 100 mg twice daily.  -Benazepril 40 mg daily  -Monitor BP    Personal history of DVT (deep vein thrombosis)  -IVC filter placed on 9//24.  -Continue warfarin.  -INR today 2.2.  INR clinic managing Coumadin.  -Per IR filter can be retrievable or permanent.  Set up filter removal when it is no longer needed through IR.    Depression with anxiety  -Continue prior to admit buspirone 15 mg twice daily.  -Continue prior to admit amitriptyline 25 mg nightly.  -Continue prior to admit duloxetine 60 mg nightly.    Acute on chronic anemia  Thrombocytopenia  -Hemoglobin 10.1.  -Platelets slowly trending down from baseline of 219.  Last platelets 148.  -Mild AST elevation but improving most likely to poor perfusion.    QT prolongation  -On multiple  sedating and QT prolonging meds at baseline including amitriptyline, buprenorphine as well as pantoprazole and furosemide.  -Lasix and chlorthalidone continue to be on hold.  -EKG showed possible new right bundle branch block with ST depression and some chest leads on 9/6.  No past TTE in system.  Patient underwent TTE, CT CA and echo which were reassuring.  -Last QTc 510.     History of tobacco abuse  -Smoked 2 packs/day.  -Nicotine patch removed due to patient resuming smoking.      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     DVT (deep venous thrombosis) (H) 09/08/2024     Dyslipidemia 11/17/2018     Essential hypertension 11/17/2018     Hypertension 09/13/2024     CORIE (generalized anxiety disorder) 09/08/2024     Gastric ulcer 03/14/2019     Gout 09/08/2024     Heartburn 09/13/2024     Sexually transmitted infection 09/13/2024     Post-operative pain 09/08/2024     Peripheral neuropathy 03/14/2019     Migraines 03/14/2019     Major depression 09/08/2024     Hyponatremia 09/08/2024     Hypercholesterolemia 03/14/2019     Sleep apnea 03/14/2019     Myelopathy concurrent with and due to stenosis of lumbar spine (H) 09/13/2024     Spinal stenosis of lumbosacral region 09/06/2024     DM2 (diabetes mellitus, type 2) (H) 09/08/2024     Type 2 diabetes mellitus with complication, without long-term current use of insulin (H) 11/17/2018     Erectile dysfunction 09/13/2024     Deep vein thrombosis (DVT) (H) 09/13/2024     Resolved Ambulatory Problems     Diagnosis Date Noted     No Resolved Ambulatory Problems     No Additional Past Medical History     Allergies   Allergen Reactions     Penicillins Hives and Swelling     Empagliflozin Other (See Comments)     Hypotension       All Meds and Allergies reviewed in the record at the facility and is the most up-to-date.    Current Outpatient Medications   Medication  Sig Dispense Refill     acetaminophen (TYLENOL) 500 MG tablet Take 1,000 mg by mouth 4 times daily.       albuterol (PROAIR HFA/PROVENTIL HFA/VENTOLIN HFA) 108 (90 Base) MCG/ACT inhaler Inhale 2 puffs into the lungs 4 times daily as needed for shortness of breath, wheezing or cough.       allopurinol (ZYLOPRIM) 300 MG tablet Take 300 mg by mouth daily.       amitriptyline (ELAVIL) 25 MG tablet Take 25 mg by mouth at bedtime.       aspirin (ASA) 325 MG EC tablet Take 325 mg by mouth daily.       atorvastatin (LIPITOR) 80 MG tablet Take 80 mg by mouth at bedtime.       benazepril (LOTENSIN) 40 MG tablet Take 40 mg by mouth every morning.       buprenorphine (SUBUTEX) 2 MG SUBL sublingual tablet Place 1 tablet (2 mg) under the tongue daily. 14 tablet 0     busPIRone (BUSPAR) 15 MG tablet Take 15 mg by mouth 2 times daily.       camphor-menthol-methyl sal 4-10-30 % CREA Apply topically 3 times daily.       celecoxib (CELEBREX) 200 MG capsule Take 200 mg by mouth 2 times daily.       diazepam (VALIUM) 5 MG tablet Take 1 tablet (5 mg) by mouth 2 times daily as needed for anxiety. 30 tablet 0     DULoxetine (CYMBALTA) 60 MG capsule Take 60 mg by mouth at bedtime.       fenofibrate (TRICOR) 145 MG tablet Take 145 mg by mouth daily.       guaiFENesin (MUCINEX) 600 MG 12 hr tablet Take 1,200 mg by mouth 2 times daily.       insulin aspart (NOVOLOG PEN) 100 UNIT/ML pen Inject subcutaneously 3 times daily (with meals). Per Sliding Scale;  If Blood Sugar is less than 70, call MD.  If Blood Sugar is 150 to 199, give 2 Units.  If Blood Sugar is 200 to 249, give 4 Units.  If Blood Sugar is 250 to 299, give 6 Units.  If Blood Sugar is 300 to 349, give 8 Units.  If Blood Sugar is 350 to 399, give 10 Units.  If Blood Sugar is 400 to 499, give 12 Units.       insulin glargine (LANTUS PEN) 100 UNIT/ML pen Inject 44 Units subcutaneously at bedtime.       levothyroxine (SYNTHROID/LEVOTHROID) 75 MCG tablet Take 75 mcg by mouth every  "morning.       methocarbamol (ROBAXIN) 750 MG tablet Take 750 mg by mouth 4 times daily.       metoprolol tartrate (LOPRESSOR) 100 MG tablet Take 100 mg by mouth 2 times daily.       morphine (MS CONTIN) 30 MG CR tablet Take 1 tablet (30 mg) by mouth every 12 hours. 30 tablet 0     morphine (MSIR) 15 MG IR tablet Take 1 tablet (15 mg) by mouth 4 times daily as needed for severe pain. 30 tablet 0     naloxone (NARCAN) 4 MG/0.1ML nasal spray Spray 4 mg into one nostril alternating nostrils as needed for opioid reversal. every 2-3 minutes until assistance arrives       nicotine (NICODERM CQ) 21 MG/24HR 24 hr patch Place 1 patch onto the skin every 24 hours.       omeprazole (PRILOSEC) 20 MG DR capsule Take 20 mg by mouth daily.       polyethylene glycol (MIRALAX) 17 g packet Take 17 g by mouth daily as needed for constipation.       pregabalin (LYRICA) 150 MG capsule Take 1 capsule (150 mg) by mouth 2 times daily. 30 capsule 2     senna (SENOKOT) 8.6 MG tablet Take 1 tablet by mouth daily as needed for constipation.       senna-docusate (SENOKOT-S/PERICOLACE) 8.6-50 MG tablet Take 2 tablets by mouth 2 times daily.       tamsulosin (FLOMAX) 0.4 MG capsule Take 0.4 mg by mouth daily.       No current facility-administered medications for this visit.       REVIEW OF SYSTEMS:   10 point review of systems reviewed and pertinent positives in the HPI.     PHYSICAL EXAMINATION:  Physical Exam     Vital signs: /76   Pulse 68   Temp (!) 96.3  F (35.7  C)   Resp 18   Ht 1.994 m (6' 6.5\")   Wt 142.1 kg (313 lb 3.2 oz)   SpO2 97%   BMI 35.73 kg/m    General: Awake, Alert, oriented x3, lying in bed,conversant  HEENT: Pink conjunctiva, moist oral mucosa  NECK: Supple  CVS:  S1  S2, without murmur or gallop.   LUNG: Clear to auscultation, No wheezes, rales or rhonci.  BACK: lumbar spine incision to top of thoracic spine dry and intact.  Tenderness at the left SI joint with palpitation.  Lidocaine patch in place.  ABDOMEN: " Soft, obese, with positive bowel sounds.  Abdominal incision intact.  \No redness or warmth.  EXTREMITIES: Good range of motion on both upper and lower extremities, no pedal edema, no calf tenderness  NEUROLOGIC: Intact, pulses palpable  PSYCHIATRIC: Cognition intact      Labs:  All labs reviewed in the nursing home record and Epic   @  Lab Results   Component Value Date    WBC 7.9 09/13/2024     Lab Results   Component Value Date    RBC 3.48 09/13/2024     Lab Results   Component Value Date    HGB 10.1 09/13/2024     Lab Results   Component Value Date    HCT 30.6 09/13/2024     Lab Results   Component Value Date    MCV 88 09/13/2024     Lab Results   Component Value Date    MCH 29.0 09/13/2024     Lab Results   Component Value Date    MCHC 33.0 09/13/2024     Lab Results   Component Value Date    RDW 16.7 09/13/2024     Lab Results   Component Value Date     09/13/2024        @Last Comprehensive Metabolic Panel:  Sodium   Date Value Ref Range Status   09/13/2024 136 135 - 145 mmol/L Final     Potassium   Date Value Ref Range Status   09/13/2024 4.0 3.4 - 5.3 mmol/L Final     Chloride   Date Value Ref Range Status   09/13/2024 101 98 - 107 mmol/L Final     Carbon Dioxide (CO2)   Date Value Ref Range Status   09/13/2024 26 22 - 29 mmol/L Final     Anion Gap   Date Value Ref Range Status   09/13/2024 9 7 - 15 mmol/L Final     Glucose   Date Value Ref Range Status   09/13/2024 184 (H) 70 - 99 mg/dL Final     Urea Nitrogen   Date Value Ref Range Status   09/13/2024 11.8 6.0 - 20.0 mg/dL Final     Creatinine   Date Value Ref Range Status   09/13/2024 0.62 (L) 0.67 - 1.17 mg/dL Final     GFR Estimate   Date Value Ref Range Status   09/13/2024 >90 >60 mL/min/1.73m2 Final     Comment:     eGFR calculated using 2021 CKD-EPI equation.     Calcium   Date Value Ref Range Status   09/13/2024 8.7 (L) 8.8 - 10.4 mg/dL Final     Comment:     Reference intervals for this test were updated on 7/16/2024 to reflect our healthy  population more accurately. There may be differences in the flagging of prior results with similar values performed with this method. Those prior results can be interpreted in the context of the updated reference intervals.       This note has been dictated using voice recognition software. Any grammatical or context distortions are unintentional and inherent to the software    Electronically signed by: Liliane Pratt CNP       Sincerely,        Liliane Pratt NP

## 2024-10-01 NOTE — PROGRESS NOTES
[facility] GERIATRIC SERVICES    Code Status:  FULL CODE   Visit Type:   Chief Complaint   Patient presents with    TCU Follow Up     Facility:  [facility] ([facility]) [[id number]]         HPI: Luiz Matthews is a 49 year old male who I am seeing today for follow up on  the TCU. Past medical history includes chronic back pain with radiculopathy, chronic opioid use, history of DVT on warfarin, hypertension, dyslipidemia, GERD with history of gastric ulcer, obesity, obstructive sleep apnea, depression with anxiety, BPH, hypothyroidism, gout, tobacco abuse, EtOH history in remission, thrombocytopenia and type 2 diabetes on insulin.  Patient with a history of spinal stenosis of the lumbar sacral region.  He is underwent surgery with a failed decompression in the past.  Patient underwent anterior spinal fusion of L1-S1 and posterior spinal fusion with instrumentation of T4-S1.  He had a large blood loss volume of 4050 cc requiring 1 unit of fresh frozen plasma.  History of chronic pain syndrome.  He was on oxycodone, methocarbamol, diazepam, amitriptyline and hydroxyzine at home.  He was seen by the pain team several times during his hospitalization and ultimately was placed on long-acting and short acting morphine.  Also on buprenorphine. He continues on methocarbamol and diazepam.  He was followed outpatient by pain clinic.  TLSO splinting out of bed.  History of major depression with anxiety.  Continues on buspirone, amitriptyline and duloxetine.  He is not on his hydroxyzine due to prolonged QTc during hospitalization.  Obstructive sleep apnea on CPAP.  BPH on Flomax.  Hypertension.  Metoprolol increased during hospitalization.  His Lasix and chlorthalidone continue to be on hold due to poor oral intake.  He is also on benazepril.  History of DVT back in his 20s while being a .  He had an IVC filter placed on 9//24 preventatively before surgery.  Continues on warfarin.    Transitional Care Course: Today  patient lying in bed.  His son is present on exam.  Pt s/p posterior and interior spinal fusion with instrumentation. Incisions to posterior and anterior spine intact. No redness or warmth.  Patient seen by surgeon on 9/26/2024.  Plans are to undergo CT with injection on 10/3/2024.  Gabapentin also discontinued and patient started on Lyrica 150 mg twice daily.  He was also placed on Medrol Dosepak.  He continues on MS Contin 30 mg twice daily and morphine IR 15 mg 4 times daily.  He is also taking diazepam twice daily as needed and buprenorphine and amitriptyline.  He has a history of chronic pain syndrome.  Initially thought his pain had improved when the steroids began however feels recent changes are not very helpful.  He continues to report pain in his left SI joint radiating down his thigh and into his groin.  He was receiving e-stim therapy however felt that was not helpful.  He is ambulatory with a rolling walker.  He had been on a nicotine patch.  History of smoking.  However today he was found outside smoking.  Patch was removed.  He reports increased anxiety and depressive symptoms.  He would like to return home to [place].  Dr. Grayson would like for him to remain here until symptoms are worked up.  He did report some diarrhea over the weekend which she thought was linked to food poisoning from eating some Chinese food.  This is now resolved.  Diabetes mellitus type 2 on Lantus and Ozempic.  Patient denies shortness of breath or chest pain.    Assessment/Plan:     Chronic low back pain with sciatica, sciatica laterality unspecified, unspecified back pain laterality  S/P lumbar spinal fusion  Chronic pain syndrome  -Status post anterior spinal interbody fusion L1-S1; status post posterior spine fusion with instrumentation T4-S1.  -Encourage out of bed activity.  -TLSO (may remove when up ambulating or out of bed).   -Continue methocarbamol 750 mg every 6 hours  -Continue MS Contin 30 mg every 12 hours. (Attempt  to wean)   -Continue morphine immediate release 15 mg QID prn.   Every 4 hours pain assessment.  -Diazepam 5 mg twice daily as needed.  -Celebrex 200 mg 3 times daily until 9/30/2024.  -Continue buprenorphine 2 mg daily.  -Muscle rub TID.   -PT to eval for pain modalities.   -Follow up with Neurosurgeon on 9/26. Plans to have CT with injection on 10/3/24 at [facility].   -Lidocaine patch on 12/off 12.   -Continues on Medrol Dosepak.    Idiopathic progressive neuropathy  -Gabapentin discontinued and patient started on Lyrica 150 mg twice daily.    Type 2 diabetes mellitus with hyperosmolarity without coma, with long-term current use of insulin (H)  -Consistent carb diet.  -Accu-Cheks 4 times daily.  -Lantus to 44 units nightly.  -Continue sliding scale with meals 3 times daily.  -Currently off Ozempic, hold the glimepiride and metformin.   -Monitor BS and make adjustments as necessary.     Sleep apnea, unspecified type  -Continue CPAP.    Benign essential hypertension  -Currently his home chlorthalidone and Lasix are being held. (Home dose of Lasix was 40 mg in the a.m.).   -Metoprolol 100 mg twice daily.  -Benazepril 40 mg daily  -Monitor BP    Personal history of DVT (deep vein thrombosis)  -IVC filter placed on 9//24.  -Continue warfarin.  -INR today 2.2.  INR clinic managing Coumadin.  -Per IR filter can be retrievable or permanent.  Set up filter removal when it is no longer needed through IR.    Depression with anxiety  -Continue prior to admit buspirone 15 mg twice daily.  -Continue prior to admit amitriptyline 25 mg nightly.  -Continue prior to admit duloxetine 60 mg nightly.    Acute on chronic anemia  Thrombocytopenia  -Hemoglobin 10.1.  -Platelets slowly trending down from baseline of 219.  Last platelets 148.  -Mild AST elevation but improving most likely to poor perfusion.    QT prolongation  -On multiple sedating and QT prolonging meds at baseline including amitriptyline, buprenorphine as well as  pantoprazole and furosemide.  -Lasix and chlorthalidone continue to be on hold.  -EKG showed possible new right bundle branch block with ST depression and some chest leads on 9/6.  No past TTE in system.  Patient underwent TTE, CT CA and echo which were reassuring.  -Last QTc 510.     History of tobacco abuse  -Smoked 2 packs/day.  -Nicotine patch removed due to patient resuming smoking.      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    DVT (deep venous thrombosis) (H) 09/08/2024    Dyslipidemia 11/17/2018    Essential hypertension 11/17/2018    Hypertension 09/13/2024    CORIE (generalized anxiety disorder) 09/08/2024    Gastric ulcer 03/14/2019    Gout 09/08/2024    Heartburn 09/13/2024    Sexually transmitted infection 09/13/2024    Post-operative pain 09/08/2024    Peripheral neuropathy 03/14/2019    Migraines 03/14/2019    Major depression 09/08/2024    Hyponatremia 09/08/2024    Hypercholesterolemia 03/14/2019    Sleep apnea 03/14/2019    Myelopathy concurrent with and due to stenosis of lumbar spine (H) 09/13/2024    Spinal stenosis of lumbosacral region 09/06/2024    DM2 (diabetes mellitus, type 2) (H) 09/08/2024    Type 2 diabetes mellitus with complication, without long-term current use of insulin (H) 11/17/2018    Erectile dysfunction 09/13/2024    Deep vein thrombosis (DVT) (H) 09/13/2024     Resolved Ambulatory Problems     Diagnosis Date Noted    No Resolved Ambulatory Problems     No Additional Past Medical History     Allergies   Allergen Reactions    Penicillins Hives and Swelling    Empagliflozin Other (See Comments)     Hypotension       All Meds and Allergies reviewed in the record at the facility and is the most up-to-date.    Current Outpatient Medications   Medication Sig Dispense Refill    acetaminophen (TYLENOL) 500 MG tablet Take 1,000 mg by mouth 4 times daily.      albuterol (PROAIR  HFA/PROVENTIL HFA/VENTOLIN HFA) 108 (90 Base) MCG/ACT inhaler Inhale 2 puffs into the lungs 4 times daily as needed for shortness of breath, wheezing or cough.      allopurinol (ZYLOPRIM) 300 MG tablet Take 300 mg by mouth daily.      amitriptyline (ELAVIL) 25 MG tablet Take 25 mg by mouth at bedtime.      aspirin (ASA) 325 MG EC tablet Take 325 mg by mouth daily.      atorvastatin (LIPITOR) 80 MG tablet Take 80 mg by mouth at bedtime.      benazepril (LOTENSIN) 40 MG tablet Take 40 mg by mouth every morning.      buprenorphine (SUBUTEX) 2 MG SUBL sublingual tablet Place 1 tablet (2 mg) under the tongue daily. 14 tablet 0    busPIRone (BUSPAR) 15 MG tablet Take 15 mg by mouth 2 times daily.      camphor-menthol-methyl sal 4-10-30 % CREA Apply topically 3 times daily.      celecoxib (CELEBREX) 200 MG capsule Take 200 mg by mouth 2 times daily.      diazepam (VALIUM) 5 MG tablet Take 1 tablet (5 mg) by mouth 2 times daily as needed for anxiety. 30 tablet 0    DULoxetine (CYMBALTA) 60 MG capsule Take 60 mg by mouth at bedtime.      fenofibrate (TRICOR) 145 MG tablet Take 145 mg by mouth daily.      guaiFENesin (MUCINEX) 600 MG 12 hr tablet Take 1,200 mg by mouth 2 times daily.      insulin aspart (NOVOLOG PEN) 100 UNIT/ML pen Inject subcutaneously 3 times daily (with meals). Per Sliding Scale;  If Blood Sugar is less than 70, call MD.  If Blood Sugar is 150 to 199, give 2 Units.  If Blood Sugar is 200 to 249, give 4 Units.  If Blood Sugar is 250 to 299, give 6 Units.  If Blood Sugar is 300 to 349, give 8 Units.  If Blood Sugar is 350 to 399, give 10 Units.  If Blood Sugar is 400 to 499, give 12 Units.      insulin glargine (LANTUS PEN) 100 UNIT/ML pen Inject 44 Units subcutaneously at bedtime.      levothyroxine (SYNTHROID/LEVOTHROID) 75 MCG tablet Take 75 mcg by mouth every morning.      methocarbamol (ROBAXIN) 750 MG tablet Take 750 mg by mouth 4 times daily.      metoprolol tartrate (LOPRESSOR) 100 MG tablet Take  "100 mg by mouth 2 times daily.      morphine (MS CONTIN) 30 MG CR tablet Take 1 tablet (30 mg) by mouth every 12 hours. 30 tablet 0    morphine (MSIR) 15 MG IR tablet Take 1 tablet (15 mg) by mouth 4 times daily as needed for severe pain. 30 tablet 0    naloxone (NARCAN) 4 MG/0.1ML nasal spray Spray 4 mg into one nostril alternating nostrils as needed for opioid reversal. every 2-3 minutes until assistance arrives      nicotine (NICODERM CQ) 21 MG/24HR 24 hr patch Place 1 patch onto the skin every 24 hours.      omeprazole (PRILOSEC) 20 MG DR capsule Take 20 mg by mouth daily.      polyethylene glycol (MIRALAX) 17 g packet Take 17 g by mouth daily as needed for constipation.      pregabalin (LYRICA) 150 MG capsule Take 1 capsule (150 mg) by mouth 2 times daily. 30 capsule 2    senna (SENOKOT) 8.6 MG tablet Take 1 tablet by mouth daily as needed for constipation.      senna-docusate (SENOKOT-S/PERICOLACE) 8.6-50 MG tablet Take 2 tablets by mouth 2 times daily.      tamsulosin (FLOMAX) 0.4 MG capsule Take 0.4 mg by mouth daily.       No current facility-administered medications for this visit.       REVIEW OF SYSTEMS:   10 point review of systems reviewed and pertinent positives in the HPI.     PHYSICAL EXAMINATION:  Physical Exam     Vital signs: /76   Pulse 68   Temp (!) 96.3  F (35.7  C)   Resp 18   Ht 1.994 m (6' 6.5\")   Wt 142.1 kg (313 lb 3.2 oz)   SpO2 97%   BMI 35.73 kg/m    General: Awake, Alert, oriented x3, lying in bed,conversant  HEENT: Pink conjunctiva, moist oral mucosa  NECK: Supple  CVS:  S1  S2, without murmur or gallop.   LUNG: Clear to auscultation, No wheezes, rales or rhonci.  BACK: lumbar spine incision to top of thoracic spine dry and intact.  Tenderness at the left SI joint with palpitation.  Lidocaine patch in place.  ABDOMEN: Soft, obese, with positive bowel sounds.  Abdominal incision intact.  \No redness or warmth.  EXTREMITIES: Good range of motion on both upper and lower " extremities, no pedal edema, no calf tenderness  NEUROLOGIC: Intact, pulses palpable  PSYCHIATRIC: Cognition intact      Labs:  All labs reviewed in the nursing home record and Epic   @  Lab Results   Component Value Date    WBC 7.9 09/13/2024     Lab Results   Component Value Date    RBC 3.48 09/13/2024     Lab Results   Component Value Date    HGB 10.1 09/13/2024     Lab Results   Component Value Date    HCT 30.6 09/13/2024     Lab Results   Component Value Date    MCV 88 09/13/2024     Lab Results   Component Value Date    MCH 29.0 09/13/2024     Lab Results   Component Value Date    MCHC 33.0 09/13/2024     Lab Results   Component Value Date    RDW 16.7 09/13/2024     Lab Results   Component Value Date     09/13/2024        @Last Comprehensive Metabolic Panel:  Sodium   Date Value Ref Range Status   09/13/2024 136 135 - 145 mmol/L Final     Potassium   Date Value Ref Range Status   09/13/2024 4.0 3.4 - 5.3 mmol/L Final     Chloride   Date Value Ref Range Status   09/13/2024 101 98 - 107 mmol/L Final     Carbon Dioxide (CO2)   Date Value Ref Range Status   09/13/2024 26 22 - 29 mmol/L Final     Anion Gap   Date Value Ref Range Status   09/13/2024 9 7 - 15 mmol/L Final     Glucose   Date Value Ref Range Status   09/13/2024 184 (H) 70 - 99 mg/dL Final     Urea Nitrogen   Date Value Ref Range Status   09/13/2024 11.8 6.0 - 20.0 mg/dL Final     Creatinine   Date Value Ref Range Status   09/13/2024 0.62 (L) 0.67 - 1.17 mg/dL Final     GFR Estimate   Date Value Ref Range Status   09/13/2024 >90 >60 mL/min/1.73m2 Final     Comment:     eGFR calculated using 2021 CKD-EPI equation.     Calcium   Date Value Ref Range Status   09/13/2024 8.7 (L) 8.8 - 10.4 mg/dL Final     Comment:     Reference intervals for this test were updated on 7/16/2024 to reflect our healthy population more accurately. There may be differences in the flagging of prior results with similar values performed with this method. Those prior results  can be interpreted in the context of the updated reference intervals.       This note has been dictated using voice recognition software. Any grammatical or context distortions are unintentional and inherent to the software    Electronically signed by: Liliane Pratt CNP

## 2024-10-02 ENCOUNTER — TELEPHONE (OUTPATIENT)
Dept: ANTICOAGULATION | Facility: CLINIC | Age: 49
End: 2024-10-02

## 2024-10-02 NOTE — TELEPHONE ENCOUNTER
Spoke with Yolanda at [facility] who confirmed appt 10/04/2024 at noon in [place].      OPAL Chow confirmed should have 5 day warfarin.  Will follow up with care team and call [facility] back tomorrow    Sunil FrancoD BCACP  Anticoagulation Clinical Pharmacist

## 2024-10-02 NOTE — TELEPHONE ENCOUNTER
"LUCILA-PROCEDURAL ANTICOAGULATION  MANAGEMENT    ASSESSMENT     Warfarin interruption plan for ZOE on 10/04/2024 at noon.    Indication for Anticoagulation: DVT    DVT 1990's  IVC filter placed 09/04/2024    Lucila-Procedure Risk stratification for thromboembolism: low (2022 Chest guidelines)    VTE: 2022 CHEST Perioperative Management guidelines suggest against bridging for patients with Hx of VTE as sole clinical indication for warfarin except in high risk stratification patients.    RECOMMENDATION     Pre-Procedure:  Hold warfarin for 2 days, until after procedure starting: 10/02/2024  No Bridge    Post-Procedure:  Resume warfarin dose if okay with provider doing procedure on night of procedure, 10/04/2024 PM: 4.5mg  Recheck INR ~ 7 days after resuming warfarin     Plan routed to referring provider for approval  ?   Yuliana Amato [facility]    SUBJECTIVE/OBJECTIVE     Luiz SHELTON Matthews, a 49 year old male    Goal INR Range: 2.0-3.0     Patient bridged in past: Yes: last 03/2019        Wt Readings from Last 3 Encounters:   09/30/24 142.1 kg (313 lb 3.2 oz)   09/27/24 147.4 kg (325 lb)   09/24/24 147.4 kg (325 lb)      Ideal body weight: 92.6 kg (204 lb 0.6 oz)  Adjusted ideal body weight: 112.4 kg (247 lb 11.2 oz)     Estimated body mass index is 35.73 kg/m  as calculated from the following:    Height as of 9/30/24: 1.994 m (6' 6.5\").    Weight as of 9/30/24: 142.1 kg (313 lb 3.2 oz).    Lab Results   Component Value Date    INR 2.2 09/30/2024    INR 2.3 09/23/2024    INR 2.7 09/19/2024     Lab Results   Component Value Date    HGB 10.1 (L) 09/13/2024    HCT 30.6 (L) 09/13/2024     09/13/2024     Lab Results   Component Value Date    CR 0.62 (L) 09/13/2024     Estimated Creatinine Clearance: 229.1 mL/min (A) (based on SCr of 0.62 mg/dL (L)).    "

## 2024-10-02 NOTE — TELEPHONE ENCOUNTER
Anamika JOSEPH called to inform that the Dr at [facility] is having the patient hold warfarin 10/2-10/3 for a bi lateral S1 transforminal epidural injection on 10/4. The patient will also discharge home on Friday between 10-11 am.  She notes that they will call Friday am with an INR for dosing to send the patient home with.    Sending to [facility] for review.    Reina Hendrix RN    [facility]

## 2024-10-02 NOTE — TELEPHONE ENCOUNTER
Left message for [facility] to confirm that patient is holding warfarin for 2 days starting today for procedure on 10/4/24. Advised to call back Anticoagulation clinic to make sure message was received and doses are being held.    Katherine Greer RN

## 2024-10-03 ENCOUNTER — DISCHARGE SUMMARY NURSING HOME (OUTPATIENT)
Dept: GERIATRICS | Facility: CLINIC | Age: 49
End: 2024-10-03

## 2024-10-03 VITALS
HEIGHT: 78 IN | SYSTOLIC BLOOD PRESSURE: 132 MMHG | DIASTOLIC BLOOD PRESSURE: 74 MMHG | TEMPERATURE: 97.6 F | OXYGEN SATURATION: 97 % | BODY MASS INDEX: 36.4 KG/M2 | WEIGHT: 314.6 LBS | HEART RATE: 89 BPM | RESPIRATION RATE: 18 BRPM

## 2024-10-03 DIAGNOSIS — G60.3 IDIOPATHIC PROGRESSIVE NEUROPATHY: ICD-10-CM

## 2024-10-03 DIAGNOSIS — G89.4 CHRONIC PAIN SYNDROME: ICD-10-CM

## 2024-10-03 DIAGNOSIS — M54.40 CHRONIC LOW BACK PAIN WITH SCIATICA, SCIATICA LATERALITY UNSPECIFIED, UNSPECIFIED BACK PAIN LATERALITY: ICD-10-CM

## 2024-10-03 DIAGNOSIS — G89.29 CHRONIC LOW BACK PAIN WITH SCIATICA, SCIATICA LATERALITY UNSPECIFIED, UNSPECIFIED BACK PAIN LATERALITY: ICD-10-CM

## 2024-10-03 DIAGNOSIS — G62.9 PERIPHERAL POLYNEUROPATHY: ICD-10-CM

## 2024-10-03 DIAGNOSIS — E11.49 TYPE 2 DIABETES MELLITUS WITH OTHER NEUROLOGIC COMPLICATION, WITH LONG-TERM CURRENT USE OF INSULIN (H): ICD-10-CM

## 2024-10-03 DIAGNOSIS — Z79.4 TYPE 2 DIABETES MELLITUS WITH OTHER NEUROLOGIC COMPLICATION, WITH LONG-TERM CURRENT USE OF INSULIN (H): ICD-10-CM

## 2024-10-03 DIAGNOSIS — Z98.1 S/P LUMBAR SPINAL FUSION: ICD-10-CM

## 2024-10-03 DIAGNOSIS — M48.07 SPINAL STENOSIS OF LUMBOSACRAL REGION: Primary | ICD-10-CM

## 2024-10-03 DIAGNOSIS — R52 PAIN: ICD-10-CM

## 2024-10-03 DIAGNOSIS — G89.18 ACUTE POST-OPERATIVE PAIN: ICD-10-CM

## 2024-10-03 DIAGNOSIS — F41.8 DEPRESSION WITH ANXIETY: ICD-10-CM

## 2024-10-03 PROCEDURE — 99316 NF DSCHRG MGMT 30 MIN+: CPT | Performed by: NURSE PRACTITIONER

## 2024-10-03 RX ORDER — MORPHINE SULFATE 30 MG/1
30 TABLET, FILM COATED, EXTENDED RELEASE ORAL EVERY 12 HOURS
Qty: 20 TABLET | Refills: 0 | Status: SHIPPED | OUTPATIENT
Start: 2024-10-03

## 2024-10-03 NOTE — TELEPHONE ENCOUNTER
Lorna calling from [facility] left a voice mail message patient does need to hold warfarin and requests a call back to discuss.    Lorna can be reached at the [place] location today: [phone].    MARIO LivingstonN, RN  Anticoagulation Clinic

## 2024-10-03 NOTE — LETTER
10/3/2024      Luiz Matthews  [address] [facility] [address]        [facility]    Code Status:  FULL CODE   Visit Type:   Chief Complaint   Patient presents with     TCU Discharge     Facility:  [facility] ([facility]) [[id number]]         HPI: Luiz Matthews is a 49 year old male who I am seeing today for follow up on  the TCU. Past medical history includes chronic back pain with radiculopathy, chronic opioid use, history of DVT on warfarin, hypertension, dyslipidemia, GERD with history of gastric ulcer, obesity, obstructive sleep apnea, depression with anxiety, BPH, hypothyroidism, gout, tobacco abuse, EtOH history in remission, thrombocytopenia and type 2 diabetes on insulin.  Patient with a history of spinal stenosis of the lumbar sacral region.  He is underwent surgery with a failed decompression in the past.  Patient underwent anterior spinal fusion of L1-S1 and posterior spinal fusion with instrumentation of T4-S1.  He had a large blood loss volume of 4050 cc requiring 1 unit of fresh frozen plasma.  History of chronic pain syndrome.  He was on oxycodone, methocarbamol, diazepam, amitriptyline and hydroxyzine at home.  He was seen by the pain team several times during his hospitalization and ultimately was placed on long-acting and short acting morphine.  Also on buprenorphine. He continues on methocarbamol and diazepam.  He was followed outpatient by pain clinic.  TLSO splinting out of bed.  History of major depression with anxiety.  Continues on buspirone, amitriptyline and duloxetine.  He is not on his hydroxyzine due to prolonged QTc during hospitalization.  Obstructive sleep apnea on CPAP.  BPH on Flomax.  Hypertension.  Metoprolol increased during hospitalization.  His Lasix and chlorthalidone continue to be on hold due to poor oral intake.  He is also on benazepril.  History of DVT back in his 20s while being a .  He had an IVC filter placed on 9//24  preventatively before surgery.  Continues on warfarin.    Transitional Care Course: Today patient lying in bed.  He was seen just prior to visit ambulating with a rolling walker in the hallway.  History of chronic back pain. Pt s/p posterior and interior spinal fusion with instrumentation. Incisions to posterior and anterior spine intact. No redness or warmth.  Patient seen by surgeon on 9/26/2024.  Plans are to undergo CT with injection on 10/4/2024.  Patient with continued pain postsurgery greater on the left in the SI region radiating down into his buttock, left thigh and groin.  His warfarin has been on hold 2 days prior to procedure.  Will need to resume postprocedure.  Gabapentin also discontinued and patient started on Lyrica 150 mg twice daily.  He was also placed on Medrol Dosepak which has been completed.  He continues on MS Contin 30 mg twice daily and morphine IR 15 mg 4 times daily.  He is also taking diazepam twice daily as needed and buprenorphine and amitriptyline.  He has a history of chronic pain syndrome.  He was being followed outpatient by the pain clinic in [place]. History of smoking.  Initially on nicotine patch however has resumed smoking due to increased anxiety and depression.  Patient is very eager to return home.  He is having regular bowel movements.  He denies any shortness of breath or chest pain.  Diabetes mellitus type 2 on Lantus and Ozempic.    Today arrangements were made with patient's primary care provider to assist in medication/narcotic refills once patient has returned home.  Also for primary care provider to sign orders for home care.  Patient plans to return home after his injection in the AM.  Multiple message has been left with Dr. Grayson's office attempting to correlate discharge as well as obtain imaging results.    Assessment/Plan:     Chronic low back pain with sciatica, sciatica laterality unspecified, unspecified back pain laterality  S/P lumbar spinal fusion  Chronic  pain syndrome  -Status post anterior spinal interbody fusion L1-S1; status post posterior spine fusion with instrumentation T4-S1.  -Encourage out of bed activity.  -TLSO (may remove when up ambulating or out of bed).   -Continue methocarbamol 750 mg every 6 hours  -Continue MS Contin 30 mg every 12 hours. (Attempt to wean)   -Continue morphine immediate release 15 mg QID prn.   Every 4 hours pain assessment.  -Diazepam 5 mg twice daily as needed.  -Celebrex 200 mg 3 times daily until 9/30/2024.  -Continue buprenorphine 2 mg daily. (Home dose was 4 mg).   -Muscle rub TID.   -Follow up with Neurosurgeon (Dr. Grayson) on 9/26. Plans to have CT 10/3 and injection on 10/4/24 at [facility].   -Lidocaine patch on 12/off 12.   -Medrol Dosepak complete.     Idiopathic progressive neuropathy  -Gabapentin discontinued and patient started on Lyrica 150 mg twice daily per Neurosurgery.     Type 2 diabetes mellitus with hyperosmolarity without coma, with long-term current use of insulin (H)  -Consistent carb diet.  -Accu-Cheks 4 times daily.  -Lantus to 44 units nightly.  -Continue sliding scale with meals 3 times daily.  -Currently off Ozempic, hold the glimepiride and metformin.   -Monitor BS and make adjustments as necessary.     Sleep apnea, unspecified type  -Continue CPAP.    Benign essential hypertension  -Currently his home chlorthalidone and Lasix are being held. (Home dose of Lasix was 40 mg in the a.m.).   -Metoprolol 100 mg twice daily.  -Benazepril 40 mg daily  -Monitor BP    Personal history of DVT (deep vein thrombosis)  -IVC filter placed on 9//24.  -warfarin currently on hold for procedure on 10/4.   -Follow up INR on 10/4. Ok to resume Warfarin post procedure.   - INR clinic managing Coumadin.  -Per IR filter can be retrievable or permanent.  Set up filter removal when it is no longer needed through IR.    Depression with anxiety  -Continue prior to admit buspirone 15 mg twice daily.  -Continue prior to admit  amitriptyline 25 mg nightly.  -Continue prior to admit duloxetine 60 mg nightly.    Acute on chronic anemia  Thrombocytopenia  -Hemoglobin 10.1.  -Platelets slowly trending down from baseline of 219.  Last platelets 148.  -Mild AST elevation but improving most likely to poor perfusion.    QT prolongation  -On multiple sedating and QT prolonging meds at baseline including amitriptyline, buprenorphine as well as pantoprazole and furosemide.  -Lasix and chlorthalidone continue to be on hold.  -EKG showed possible new right bundle branch block with ST depression and some chest leads on 9/6.  No past TTE in system.  Patient underwent TTE, CT CA and echo which were reassuring.  -Last QTc 510.     History of tobacco abuse  -Smoked 2 packs/day.  -Nicotine patch removed due to patient resuming smoking.    Chronic pain syndrome  -see above for management.   -follow up with pain clinic out pt.     -ok to discharge home with current meds and treatments.   -PCP to refill narcotics/controlled substances. Med Rec has been sent to PCP.   -PCP in [place] to write for home care services including PT, OT, HHA and RN (medication management).   -Follow up with PCP in 1-2 weeks.       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     DVT (deep venous thrombosis) (H) 09/08/2024     Dyslipidemia 11/17/2018     Essential hypertension 11/17/2018     Hypertension 09/13/2024     CORIE (generalized anxiety disorder) 09/08/2024     Gastric ulcer 03/14/2019     Gout 09/08/2024     Heartburn 09/13/2024     Sexually transmitted infection 09/13/2024     Post-operative pain 09/08/2024     Peripheral neuropathy 03/14/2019     Migraines 03/14/2019     Major depression 09/08/2024     Hyponatremia 09/08/2024     Hypercholesterolemia 03/14/2019     Sleep apnea 03/14/2019     Myelopathy concurrent with and due to stenosis of lumbar spine (H) 09/13/2024      Spinal stenosis of lumbosacral region 09/06/2024     DM2 (diabetes mellitus, type 2) (H) 09/08/2024     Type 2 diabetes mellitus with complication, without long-term current use of insulin (H) 11/17/2018     Erectile dysfunction 09/13/2024     Deep vein thrombosis (DVT) (H) 09/13/2024     Polycythemia vera (H) 10/14/2014     Resolved Ambulatory Problems     Diagnosis Date Noted     No Resolved Ambulatory Problems     No Additional Past Medical History     Allergies   Allergen Reactions     Penicillins Hives and Swelling     Empagliflozin Other (See Comments)     Hypotension       All Meds and Allergies reviewed in the record at the facility and is the most up-to-date.    Current Outpatient Medications   Medication Sig Dispense Refill     acetaminophen (TYLENOL) 500 MG tablet Take 1,000 mg by mouth 4 times daily.       albuterol (PROAIR HFA/PROVENTIL HFA/VENTOLIN HFA) 108 (90 Base) MCG/ACT inhaler Inhale 2 puffs into the lungs 4 times daily as needed for shortness of breath, wheezing or cough.       allopurinol (ZYLOPRIM) 300 MG tablet Take 300 mg by mouth daily.       amitriptyline (ELAVIL) 25 MG tablet Take 25 mg by mouth at bedtime.       aspirin (ASA) 325 MG EC tablet Take 325 mg by mouth daily.       atorvastatin (LIPITOR) 80 MG tablet Take 80 mg by mouth at bedtime.       benazepril (LOTENSIN) 40 MG tablet Take 40 mg by mouth every morning.       buprenorphine (SUBUTEX) 2 MG SUBL sublingual tablet Place 1 tablet (2 mg) under the tongue daily. 14 tablet 0     busPIRone (BUSPAR) 15 MG tablet Take 15 mg by mouth 2 times daily.       camphor-menthol-methyl sal 4-10-30 % CREA Apply topically 3 times daily.       celecoxib (CELEBREX) 200 MG capsule Take 200 mg by mouth 2 times daily.       diazepam (VALIUM) 5 MG tablet Take 1 tablet (5 mg) by mouth 2 times daily as needed for anxiety. 30 tablet 0     DULoxetine (CYMBALTA) 60 MG capsule Take 60 mg by mouth at bedtime.       fenofibrate (TRICOR) 145 MG tablet Take 145  mg by mouth daily.       guaiFENesin (MUCINEX) 600 MG 12 hr tablet Take 1,200 mg by mouth 2 times daily.       insulin aspart (NOVOLOG PEN) 100 UNIT/ML pen Inject subcutaneously 3 times daily (with meals). Per Sliding Scale;  If Blood Sugar is less than 70, call MD.  If Blood Sugar is 150 to 199, give 2 Units.  If Blood Sugar is 200 to 249, give 4 Units.  If Blood Sugar is 250 to 299, give 6 Units.  If Blood Sugar is 300 to 349, give 8 Units.  If Blood Sugar is 350 to 399, give 10 Units.  If Blood Sugar is 400 to 499, give 12 Units.       insulin glargine (LANTUS PEN) 100 UNIT/ML pen Inject 44 Units subcutaneously at bedtime.       levothyroxine (SYNTHROID/LEVOTHROID) 75 MCG tablet Take 75 mcg by mouth every morning.       methocarbamol (ROBAXIN) 750 MG tablet Take 750 mg by mouth 4 times daily.       metoprolol tartrate (LOPRESSOR) 100 MG tablet Take 100 mg by mouth 2 times daily.       morphine (MS CONTIN) 30 MG CR tablet Take 1 tablet (30 mg) by mouth every 12 hours. 20 tablet 0     morphine (MSIR) 15 MG IR tablet Take 1 tablet (15 mg) by mouth 4 times daily as needed for severe pain. 30 tablet 0     naloxone (NARCAN) 4 MG/0.1ML nasal spray Spray 4 mg into one nostril alternating nostrils as needed for opioid reversal. every 2-3 minutes until assistance arrives       nicotine (NICODERM CQ) 21 MG/24HR 24 hr patch Place 1 patch onto the skin every 24 hours.       omeprazole (PRILOSEC) 20 MG DR capsule Take 20 mg by mouth daily.       polyethylene glycol (MIRALAX) 17 g packet Take 17 g by mouth daily as needed for constipation.       pregabalin (LYRICA) 150 MG capsule Take 1 capsule (150 mg) by mouth 2 times daily. 30 capsule 2     senna (SENOKOT) 8.6 MG tablet Take 1 tablet by mouth daily as needed for constipation.       senna-docusate (SENOKOT-S/PERICOLACE) 8.6-50 MG tablet Take 2 tablets by mouth 2 times daily.       tamsulosin (FLOMAX) 0.4 MG capsule Take 0.4 mg by mouth daily.       No current  "facility-administered medications for this visit.       REVIEW OF SYSTEMS:   10 point review of systems reviewed and pertinent positives in the HPI.     PHYSICAL EXAMINATION:  Physical Exam     Vital signs: /74   Pulse 89   Temp 97.6  F (36.4  C)   Resp 18   Ht 1.994 m (6' 6.5\")   Wt 142.7 kg (314 lb 9.6 oz)   SpO2 97%   BMI 35.89 kg/m    General: Awake, Alert, oriented x3, lying in bed,conversant  HEENT: Pink conjunctiva, moist oral mucosa  NECK: Supple  CVS:  S1  S2, without murmur or gallop.   LUNG: Clear to auscultation, No wheezes, rales or rhonci.  BACK: lumbar spine incision to top of thoracic spine dry and intact.  Tenderness at the left SI joint with palpitation.  Lidocaine patch in place.  ABDOMEN: Soft, obese, with positive bowel sounds.  Abdominal incision intact. No redness or warmth.  EXTREMITIES: Good range of motion on both upper and lower extremities, no pedal edema, no calf tenderness. Ambulatory with rolling walker.   NEUROLOGIC: Intact, pulses palpable  PSYCHIATRIC: Cognition intact. Pleasant affect.       Labs:  All labs reviewed in the nursing home record and [facility]   @  Lab Results   Component Value Date    WBC 7.9 09/13/2024     Lab Results   Component Value Date    RBC 3.48 09/13/2024     Lab Results   Component Value Date    HGB 10.1 09/13/2024     Lab Results   Component Value Date    HCT 30.6 09/13/2024     Lab Results   Component Value Date    MCV 88 09/13/2024     Lab Results   Component Value Date    MCH 29.0 09/13/2024     Lab Results   Component Value Date    MCHC 33.0 09/13/2024     Lab Results   Component Value Date    RDW 16.7 09/13/2024     Lab Results   Component Value Date     09/13/2024        @Last Comprehensive Metabolic Panel:  Sodium   Date Value Ref Range Status   09/13/2024 136 135 - 145 mmol/L Final     Potassium   Date Value Ref Range Status   09/13/2024 4.0 3.4 - 5.3 mmol/L Final     Chloride   Date Value Ref Range Status   09/13/2024 101 98 - 107 " mmol/L Final     Carbon Dioxide (CO2)   Date Value Ref Range Status   09/13/2024 26 22 - 29 mmol/L Final     Anion Gap   Date Value Ref Range Status   09/13/2024 9 7 - 15 mmol/L Final     Glucose   Date Value Ref Range Status   09/13/2024 184 (H) 70 - 99 mg/dL Final     Urea Nitrogen   Date Value Ref Range Status   09/13/2024 11.8 6.0 - 20.0 mg/dL Final     Creatinine   Date Value Ref Range Status   09/13/2024 0.62 (L) 0.67 - 1.17 mg/dL Final     GFR Estimate   Date Value Ref Range Status   09/13/2024 >90 >60 mL/min/1.73m2 Final     Comment:     eGFR calculated using 2021 CKD-EPI equation.     Calcium   Date Value Ref Range Status   09/13/2024 8.7 (L) 8.8 - 10.4 mg/dL Final     Comment:     Reference intervals for this test were updated on 7/16/2024 to reflect our healthy population more accurately. There may be differences in the flagging of prior results with similar values performed with this method. Those prior results can be interpreted in the context of the updated reference intervals.     DISCHARGE PLAN/FACE TO FACE:  I certify that this patient is under my care and that I, or a nurse practitioner or physician's assistant working with me, had a face-to-face encounter that meets the physician face-to-face encounter requirements with this patient.       I certify that, based on my findings, the following services are medically necessary home health services.    My clinical findings support the need for the above skilled services.    This patient is homebound because: History of spinal stenosis of the lumbar sacral region.  He is underwent surgery with a failed decompression in the past.  Patient underwent anterior spinal fusion of L1-S1 and posterior spinal fusion with instrumentation of T4-S1.     The patient is, or has been, under my care and I have initiated the establishment of the plan of care. This patient will be followed by a physician who will periodically review the plan of care.    > 45 minutes spent  reviewing medications, home care services and follow ups as well as collaborating with nursing, PCP and Dr. Grayson's office for discharge planning.     This note has been dictated using voice recognition software. Any grammatical or context distortions are unintentional and inherent to the software    Electronically signed by: Liliane Pratt CNP       Sincerely,        Liliane Pratt NP       No

## 2024-10-03 NOTE — TELEPHONE ENCOUNTER
Spoke with nurse at [facility] who confirms that patient will have INR drawn on 10/4/24. [facility] today to confirm if hold was completed from 10/1-10/3. Advised covering nurse to make sure patient holds warfarin through tomorrow.    Awaiting confirmation, advised to call Anticoagulation clinic immediately after taking INR tomorrow so that [facility] can be updated if INR is not <1.5.    Katherine Greer RN

## 2024-10-03 NOTE — TELEPHONE ENCOUNTER
Spoke with Lorna at [facility], as of 10/1 had not taken warfarin, related hold orders given for warfarin last night, will confirm at [facility].  Target INR is <1.5 for procedure, need INR 10/04/2024 AM INR checked.    If INR is 1.5 or more [facility] needs to call  and leave message for Pat at [facility] [phone]    Yuliana Amato, PharmD BCACP  Anticoagulation Clinical Pharmacist

## 2024-10-04 ENCOUNTER — ANTICOAGULATION THERAPY VISIT (OUTPATIENT)
Dept: ANTICOAGULATION | Facility: CLINIC | Age: 49
End: 2024-10-04

## 2024-10-04 ENCOUNTER — LAB REQUISITION (OUTPATIENT)
Dept: LAB | Facility: CLINIC | Age: 49
End: 2024-10-04

## 2024-10-04 DIAGNOSIS — Z47.89 ENCOUNTER FOR OTHER ORTHOPEDIC AFTERCARE: ICD-10-CM

## 2024-10-04 DIAGNOSIS — I82.409 DEEP VEIN THROMBOSIS (DVT) (H): Primary | ICD-10-CM

## 2024-10-04 LAB — INR (EXTERNAL): 1.4 (ref 0.9–1.1)

## 2024-10-04 NOTE — PROGRESS NOTES
ANTICOAGULATION MANAGEMENT     Luiz Matthews 49 year old male is on warfarin with subtherapeutic INR result. (Goal INR 2.0-3.0)    Recent labs: (last 7 days)     10/04/24  0000   INR 1.4*       ASSESSMENT     Source(s): Chart Review and Home Care/Facility Nurse [facility] [phone]     Warfarin doses taken: Held for injection (today)  recently which may be affecting INR- held Wed and Thurs  Diet: No new diet changes identified  Medication/supplement changes: None noted  New illness, injury, or hospitalization: No  Signs or symptoms of bleeding or clotting: No  Previous result: Therapeutic last 2(+) visits  Additional findings: pt will be discharging today at 9:45. He will be going back to [place]. Per Anamika, he is already set up to have his INR done on Monday and his previous providers will be taking over care when he gets back.            PLAN     Recommended plan for temporary change(s) affecting INR     Dosing Instructions:  take 4.5 mg tonight, then resume 3 mg daily  with next INR in 3 days   (per procedure plan from 10/2)    Summary  As of 10/4/2024      Full warfarin instructions:  10/4: 4 mg; Otherwise 3 mg every day   Next INR check:  10/7/2024               Telephone call with [facility] facility nurse who verbalizes understanding and agrees to plan    Orders given to  Homecare nurse/facility to recheck    Education provided: Please call back if any changes to your diet, medications or how you've been taking warfarin  Goal range and lab monitoring: goal range and significance of current result, Importance of therapeutic range, and Importance of following up at instructed interval  Symptom monitoring: monitoring for clotting signs and symptoms and monitoring for stroke signs and symptoms    Plan made per [facility] anticoagulation protocol    Michaelle Maharaj, RN  10/4/2024  Anticoagulation Clinic  Yones for routing messages: [facility] (TCU/LTC/FDC)  [facility] patient phone line:  [phone]        _______________________________________________________________________     Anticoagulation Episode Summary       Current INR goal:  2.0-3.0   TTR:  72.7% (1.6 wk)   Target end date:  Indefinite   Send INR reminders to:  [facility] (TCU/LTC/nursing home)    Indications    Deep vein thrombosis (DVT) (H) [I82.409]  DVT (deep venous thrombosis) (H) [I82.409]             Comments:  [facility]   [phone]                 Anticoagulation Care Providers       Provider Role Specialty Phone number    Liliane Pratt NP Referring Nurse Practitioner [phone]

## 2024-10-04 NOTE — TELEPHONE ENCOUNTER
I called and left a VM at [facility] ([phone]) and let him know that pt's INR was 1.4 today.  Michaelle Maharaj RN

## 2024-10-04 NOTE — PROGRESS NOTES
[facility]    Code Status:  FULL CODE   Visit Type:   Chief Complaint   Patient presents with    TCU Discharge     Facility:  [facility] ([facility]) [[id number]]         HPI: Luiz Matthews is a 49 year old male who I am seeing today for follow up on  the TCU. Past medical history includes chronic back pain with radiculopathy, chronic opioid use, history of DVT on warfarin, hypertension, dyslipidemia, GERD with history of gastric ulcer, obesity, obstructive sleep apnea, depression with anxiety, BPH, hypothyroidism, gout, tobacco abuse, EtOH history in remission, thrombocytopenia and type 2 diabetes on insulin.  Patient with a history of spinal stenosis of the lumbar sacral region.  He is underwent surgery with a failed decompression in the past.  Patient underwent anterior spinal fusion of L1-S1 and posterior spinal fusion with instrumentation of T4-S1.  He had a large blood loss volume of 4050 cc requiring 1 unit of fresh frozen plasma.  History of chronic pain syndrome.  He was on oxycodone, methocarbamol, diazepam, amitriptyline and hydroxyzine at home.  He was seen by the pain team several times during his hospitalization and ultimately was placed on long-acting and short acting morphine.  Also on buprenorphine. He continues on methocarbamol and diazepam.  He was followed outpatient by pain clinic.  TLSO splinting out of bed.  History of major depression with anxiety.  Continues on buspirone, amitriptyline and duloxetine.  He is not on his hydroxyzine due to prolonged QTc during hospitalization.  Obstructive sleep apnea on CPAP.  BPH on Flomax.  Hypertension.  Metoprolol increased during hospitalization.  His Lasix and chlorthalidone continue to be on hold due to poor oral intake.  He is also on benazepril.  History of DVT back in his 20s while being a .  He had an IVC filter placed on 9//24 preventatively before surgery.  Continues on warfarin.    Transitional Care Course: Today  patient lying in bed.  He was seen just prior to visit ambulating with a rolling walker in the hallway.  History of chronic back pain. Pt s/p posterior and interior spinal fusion with instrumentation. Incisions to posterior and anterior spine intact. No redness or warmth.  Patient seen by surgeon on 9/26/2024.  Plans are to undergo CT with injection on 10/4/2024.  Patient with continued pain postsurgery greater on the left in the SI region radiating down into his buttock, left thigh and groin.  His warfarin has been on hold 2 days prior to procedure.  Will need to resume postprocedure.  Gabapentin also discontinued and patient started on Lyrica 150 mg twice daily.  He was also placed on Medrol Dosepak which has been completed.  He continues on MS Contin 30 mg twice daily and morphine IR 15 mg 4 times daily.  He is also taking diazepam twice daily as needed and buprenorphine and amitriptyline.  He has a history of chronic pain syndrome.  He was being followed outpatient by the pain clinic in [place]. History of smoking.  Initially on nicotine patch however has resumed smoking due to increased anxiety and depression.  Patient is very eager to return home.  He is having regular bowel movements.  He denies any shortness of breath or chest pain.  Diabetes mellitus type 2 on Lantus and Ozempic.    Today arrangements were made with patient's primary care provider to assist in medication/narcotic refills once patient has returned home.  Also for primary care provider to sign orders for home care.  Patient plans to return home after his injection in the AM.  Multiple message has been left with Dr. Grayson's office attempting to correlate discharge as well as obtain imaging results.    Assessment/Plan:     Chronic low back pain with sciatica, sciatica laterality unspecified, unspecified back pain laterality  S/P lumbar spinal fusion  Chronic pain syndrome  -Status post anterior spinal interbody fusion L1-S1; status post posterior  spine fusion with instrumentation T4-S1.  -Encourage out of bed activity.  -TLSO (may remove when up ambulating or out of bed).   -Continue methocarbamol 750 mg every 6 hours  -Continue MS Contin 30 mg every 12 hours. (Attempt to wean)   -Continue morphine immediate release 15 mg QID prn.   Every 4 hours pain assessment.  -Diazepam 5 mg twice daily as needed.  -Celebrex 200 mg 3 times daily until 9/30/2024.  -Continue buprenorphine 2 mg daily. (Home dose was 4 mg).   -Muscle rub TID.   -Follow up with Neurosurgeon (Dr. Grayson) on 9/26. Plans to have CT 10/3 and injection on 10/4/24 at [facility].   -Lidocaine patch on 12/off 12.   -Medrol Dosepak complete.     Idiopathic progressive neuropathy  -Gabapentin discontinued and patient started on Lyrica 150 mg twice daily per Neurosurgery.     Type 2 diabetes mellitus with hyperosmolarity without coma, with long-term current use of insulin (H)  -Consistent carb diet.  -Accu-Cheks 4 times daily.  -Lantus to 44 units nightly.  -Continue sliding scale with meals 3 times daily.  -Currently off Ozempic, hold the glimepiride and metformin.   -Monitor BS and make adjustments as necessary.     Sleep apnea, unspecified type  -Continue CPAP.    Benign essential hypertension  -Currently his home chlorthalidone and Lasix are being held. (Home dose of Lasix was 40 mg in the a.m.).   -Metoprolol 100 mg twice daily.  -Benazepril 40 mg daily  -Monitor BP    Personal history of DVT (deep vein thrombosis)  -IVC filter placed on 9//24.  -warfarin currently on hold for procedure on 10/4.   -Follow up INR on 10/4. Ok to resume Warfarin post procedure.   - INR clinic managing Coumadin.  -Per IR filter can be retrievable or permanent.  Set up filter removal when it is no longer needed through IR.    Depression with anxiety  -Continue prior to admit buspirone 15 mg twice daily.  -Continue prior to admit amitriptyline 25 mg nightly.  -Continue prior to admit duloxetine 60 mg nightly.    Acute on  chronic anemia  Thrombocytopenia  -Hemoglobin 10.1.  -Platelets slowly trending down from baseline of 219.  Last platelets 148.  -Mild AST elevation but improving most likely to poor perfusion.    QT prolongation  -On multiple sedating and QT prolonging meds at baseline including amitriptyline, buprenorphine as well as pantoprazole and furosemide.  -Lasix and chlorthalidone continue to be on hold.  -EKG showed possible new right bundle branch block with ST depression and some chest leads on 9/6.  No past TTE in system.  Patient underwent TTE, CT CA and echo which were reassuring.  -Last QTc 510.     History of tobacco abuse  -Smoked 2 packs/day.  -Nicotine patch removed due to patient resuming smoking.    Chronic pain syndrome  -see above for management.   -follow up with pain clinic out pt.     -ok to discharge home with current meds and treatments.   -PCP to refill narcotics/controlled substances. Med Rec has been sent to PCP.   -PCP in [place] to write for home care services including PT, OT, HHA and RN (medication management).   -Follow up with PCP in 1-2 weeks.       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    DVT (deep venous thrombosis) (H) 09/08/2024    Dyslipidemia 11/17/2018    Essential hypertension 11/17/2018    Hypertension 09/13/2024    CORIE (generalized anxiety disorder) 09/08/2024    Gastric ulcer 03/14/2019    Gout 09/08/2024    Heartburn 09/13/2024    Sexually transmitted infection 09/13/2024    Post-operative pain 09/08/2024    Peripheral neuropathy 03/14/2019    Migraines 03/14/2019    Major depression 09/08/2024    Hyponatremia 09/08/2024    Hypercholesterolemia 03/14/2019    Sleep apnea 03/14/2019    Myelopathy concurrent with and due to stenosis of lumbar spine (H) 09/13/2024    Spinal stenosis of lumbosacral region 09/06/2024    DM2 (diabetes mellitus, type 2) (H) 09/08/2024    Type 2  diabetes mellitus with complication, without long-term current use of insulin (H) 11/17/2018    Erectile dysfunction 09/13/2024    Deep vein thrombosis (DVT) (H) 09/13/2024    Polycythemia vera (H) 10/14/2014     Resolved Ambulatory Problems     Diagnosis Date Noted    No Resolved Ambulatory Problems     No Additional Past Medical History     Allergies   Allergen Reactions    Penicillins Hives and Swelling    Empagliflozin Other (See Comments)     Hypotension       All Meds and Allergies reviewed in the record at the facility and is the most up-to-date.    Current Outpatient Medications   Medication Sig Dispense Refill    acetaminophen (TYLENOL) 500 MG tablet Take 1,000 mg by mouth 4 times daily.      albuterol (PROAIR HFA/PROVENTIL HFA/VENTOLIN HFA) 108 (90 Base) MCG/ACT inhaler Inhale 2 puffs into the lungs 4 times daily as needed for shortness of breath, wheezing or cough.      allopurinol (ZYLOPRIM) 300 MG tablet Take 300 mg by mouth daily.      amitriptyline (ELAVIL) 25 MG tablet Take 25 mg by mouth at bedtime.      aspirin (ASA) 325 MG EC tablet Take 325 mg by mouth daily.      atorvastatin (LIPITOR) 80 MG tablet Take 80 mg by mouth at bedtime.      benazepril (LOTENSIN) 40 MG tablet Take 40 mg by mouth every morning.      buprenorphine (SUBUTEX) 2 MG SUBL sublingual tablet Place 1 tablet (2 mg) under the tongue daily. 14 tablet 0    busPIRone (BUSPAR) 15 MG tablet Take 15 mg by mouth 2 times daily.      camphor-menthol-methyl sal 4-10-30 % CREA Apply topically 3 times daily.      celecoxib (CELEBREX) 200 MG capsule Take 200 mg by mouth 2 times daily.      diazepam (VALIUM) 5 MG tablet Take 1 tablet (5 mg) by mouth 2 times daily as needed for anxiety. 30 tablet 0    DULoxetine (CYMBALTA) 60 MG capsule Take 60 mg by mouth at bedtime.      fenofibrate (TRICOR) 145 MG tablet Take 145 mg by mouth daily.      guaiFENesin (MUCINEX) 600 MG 12 hr tablet Take 1,200 mg by mouth 2 times daily.      insulin aspart  (NOVOLOG PEN) 100 UNIT/ML pen Inject subcutaneously 3 times daily (with meals). Per Sliding Scale;  If Blood Sugar is less than 70, call MD.  If Blood Sugar is 150 to 199, give 2 Units.  If Blood Sugar is 200 to 249, give 4 Units.  If Blood Sugar is 250 to 299, give 6 Units.  If Blood Sugar is 300 to 349, give 8 Units.  If Blood Sugar is 350 to 399, give 10 Units.  If Blood Sugar is 400 to 499, give 12 Units.      insulin glargine (LANTUS PEN) 100 UNIT/ML pen Inject 44 Units subcutaneously at bedtime.      levothyroxine (SYNTHROID/LEVOTHROID) 75 MCG tablet Take 75 mcg by mouth every morning.      methocarbamol (ROBAXIN) 750 MG tablet Take 750 mg by mouth 4 times daily.      metoprolol tartrate (LOPRESSOR) 100 MG tablet Take 100 mg by mouth 2 times daily.      morphine (MS CONTIN) 30 MG CR tablet Take 1 tablet (30 mg) by mouth every 12 hours. 20 tablet 0    morphine (MSIR) 15 MG IR tablet Take 1 tablet (15 mg) by mouth 4 times daily as needed for severe pain. 30 tablet 0    naloxone (NARCAN) 4 MG/0.1ML nasal spray Spray 4 mg into one nostril alternating nostrils as needed for opioid reversal. every 2-3 minutes until assistance arrives      nicotine (NICODERM CQ) 21 MG/24HR 24 hr patch Place 1 patch onto the skin every 24 hours.      omeprazole (PRILOSEC) 20 MG DR capsule Take 20 mg by mouth daily.      polyethylene glycol (MIRALAX) 17 g packet Take 17 g by mouth daily as needed for constipation.      pregabalin (LYRICA) 150 MG capsule Take 1 capsule (150 mg) by mouth 2 times daily. 30 capsule 2    senna (SENOKOT) 8.6 MG tablet Take 1 tablet by mouth daily as needed for constipation.      senna-docusate (SENOKOT-S/PERICOLACE) 8.6-50 MG tablet Take 2 tablets by mouth 2 times daily.      tamsulosin (FLOMAX) 0.4 MG capsule Take 0.4 mg by mouth daily.       No current facility-administered medications for this visit.       REVIEW OF SYSTEMS:   10 point review of systems reviewed and pertinent positives in the HPI.  "    PHYSICAL EXAMINATION:  Physical Exam     Vital signs: /74   Pulse 89   Temp 97.6  F (36.4  C)   Resp 18   Ht 1.994 m (6' 6.5\")   Wt 142.7 kg (314 lb 9.6 oz)   SpO2 97%   BMI 35.89 kg/m    General: Awake, Alert, oriented x3, lying in bed,conversant  HEENT: Pink conjunctiva, moist oral mucosa  NECK: Supple  CVS:  S1  S2, without murmur or gallop.   LUNG: Clear to auscultation, No wheezes, rales or rhonci.  BACK: lumbar spine incision to top of thoracic spine dry and intact.  Tenderness at the left SI joint with palpitation.  Lidocaine patch in place.  ABDOMEN: Soft, obese, with positive bowel sounds.  Abdominal incision intact. No redness or warmth.  EXTREMITIES: Good range of motion on both upper and lower extremities, no pedal edema, no calf tenderness. Ambulatory with rolling walker.   NEUROLOGIC: Intact, pulses palpable  PSYCHIATRIC: Cognition intact. Pleasant affect.       Labs:  All labs reviewed in the nursing home record and [facility]   @  Lab Results   Component Value Date    WBC 7.9 09/13/2024     Lab Results   Component Value Date    RBC 3.48 09/13/2024     Lab Results   Component Value Date    HGB 10.1 09/13/2024     Lab Results   Component Value Date    HCT 30.6 09/13/2024     Lab Results   Component Value Date    MCV 88 09/13/2024     Lab Results   Component Value Date    MCH 29.0 09/13/2024     Lab Results   Component Value Date    MCHC 33.0 09/13/2024     Lab Results   Component Value Date    RDW 16.7 09/13/2024     Lab Results   Component Value Date     09/13/2024        @Last Comprehensive Metabolic Panel:  Sodium   Date Value Ref Range Status   09/13/2024 136 135 - 145 mmol/L Final     Potassium   Date Value Ref Range Status   09/13/2024 4.0 3.4 - 5.3 mmol/L Final     Chloride   Date Value Ref Range Status   09/13/2024 101 98 - 107 mmol/L Final     Carbon Dioxide (CO2)   Date Value Ref Range Status   09/13/2024 26 22 - 29 mmol/L Final     Anion Gap   Date Value Ref Range Status "   09/13/2024 9 7 - 15 mmol/L Final     Glucose   Date Value Ref Range Status   09/13/2024 184 (H) 70 - 99 mg/dL Final     Urea Nitrogen   Date Value Ref Range Status   09/13/2024 11.8 6.0 - 20.0 mg/dL Final     Creatinine   Date Value Ref Range Status   09/13/2024 0.62 (L) 0.67 - 1.17 mg/dL Final     GFR Estimate   Date Value Ref Range Status   09/13/2024 >90 >60 mL/min/1.73m2 Final     Comment:     eGFR calculated using 2021 CKD-EPI equation.     Calcium   Date Value Ref Range Status   09/13/2024 8.7 (L) 8.8 - 10.4 mg/dL Final     Comment:     Reference intervals for this test were updated on 7/16/2024 to reflect our healthy population more accurately. There may be differences in the flagging of prior results with similar values performed with this method. Those prior results can be interpreted in the context of the updated reference intervals.     DISCHARGE PLAN/FACE TO FACE:  I certify that this patient is under my care and that I, or a nurse practitioner or physician's assistant working with me, had a face-to-face encounter that meets the physician face-to-face encounter requirements with this patient.       I certify that, based on my findings, the following services are medically necessary home health services.    My clinical findings support the need for the above skilled services.    This patient is homebound because: History of spinal stenosis of the lumbar sacral region.  He is underwent surgery with a failed decompression in the past.  Patient underwent anterior spinal fusion of L1-S1 and posterior spinal fusion with instrumentation of T4-S1.     The patient is, or has been, under my care and I have initiated the establishment of the plan of care. This patient will be followed by a physician who will periodically review the plan of care.    > 45 minutes spent reviewing medications, home care services and follow ups as well as collaborating with nursing, PCP and Dr. Grayson's office for discharge planning.      This note has been dictated using voice recognition software. Any grammatical or context distortions are unintentional and inherent to the software    Electronically signed by: Liliane Pratt CNP